# Patient Record
Sex: MALE | Race: BLACK OR AFRICAN AMERICAN | NOT HISPANIC OR LATINO | Employment: FULL TIME | URBAN - METROPOLITAN AREA
[De-identification: names, ages, dates, MRNs, and addresses within clinical notes are randomized per-mention and may not be internally consistent; named-entity substitution may affect disease eponyms.]

---

## 2018-09-16 ENCOUNTER — HOSPITAL ENCOUNTER (EMERGENCY)
Facility: HOSPITAL | Age: 33
Discharge: HOME | End: 2018-09-16
Attending: EMERGENCY MEDICINE

## 2018-09-16 VITALS
DIASTOLIC BLOOD PRESSURE: 88 MMHG | HEIGHT: 69 IN | OXYGEN SATURATION: 95 % | HEART RATE: 85 BPM | SYSTOLIC BLOOD PRESSURE: 128 MMHG | TEMPERATURE: 99 F | RESPIRATION RATE: 16 BRPM | BODY MASS INDEX: 22.96 KG/M2 | WEIGHT: 155 LBS

## 2018-09-16 DIAGNOSIS — L72.3 SEBACEOUS CYST OF LEFT AXILLA: Primary | ICD-10-CM

## 2018-09-16 PROCEDURE — 99282 EMERGENCY DEPT VISIT SF MDM: CPT

## 2018-09-16 RX ORDER — CEPHALEXIN 500 MG/1
1000 CAPSULE ORAL
Qty: 20 CAPSULE | Refills: 0 | Status: SHIPPED | OUTPATIENT
Start: 2018-09-16 | End: 2018-09-21

## 2018-09-16 NOTE — ED PROVIDER NOTES
History  Chief Complaint   Patient presents with    Cyst     According to the patient, he has a cyst under the left axlia for about one week  Patient reports that the area is painful when moving arm  Patient complains of a cyst underneath his left armpit  He denies fever chills  It began a few days ago  He had another cyst in the same region but it drained earlier this week  None       History reviewed  No pertinent past medical history  History reviewed  No pertinent surgical history  History reviewed  No pertinent family history  I have reviewed and agree with the history as documented  Social History   Substance Use Topics    Smoking status: Never Smoker    Smokeless tobacco: Never Used    Alcohol use Yes      Comment: occasional        Review of Systems   Constitutional: Negative for chills, fatigue and fever  Cardiovascular: Negative for chest pain  Musculoskeletal: Negative for neck pain  Skin: Positive for color change and rash  Negative for wound  Neurological: Negative for dizziness  Physical Exam  Physical Exam   Constitutional: He is oriented to person, place, and time  He appears well-developed and well-nourished  No distress  HENT:   Head: Normocephalic and atraumatic  Neurological: He is alert and oriented to person, place, and time  No cranial nerve deficit  Skin: Skin is warm  No rash noted  There is erythema  Patient has a slightly indurated soft tissue mass indicative of a sebaceous cysts in his left armpit  It is approximately 4 x 2 centimeters  There is some mild erythema and slight warmth  There is no appreciable adenitis  There is a central pustule evident  No spontaneous drainage  Vitals reviewed        Vital Signs  ED Triage Vitals [09/16/18 1309]   Temperature Pulse Respirations Blood Pressure SpO2   99 2 °F (37 3 °C) 86 18 134/72 95 %      Temp Source Heart Rate Source Patient Position - Orthostatic VS BP Location FiO2 (%) Tympanic Monitor Lying Right arm --      Pain Score       7           Vitals:    09/16/18 1309 09/16/18 1418   BP: 134/72 128/88   Pulse: 86 85   Patient Position - Orthostatic VS: Lying Lying       Visual Acuity      ED Medications  Medications - No data to display    Diagnostic Studies  Results Reviewed     None                 No orders to display              Procedures  Incision/Drainage  Date/Time: 9/16/2018 4:55 PM  Performed by: Yana Larkin  Authorized by: Ken CORDERO     Patient location:  ED  Other Assisting Provider: No    Consent:     Consent obtained:  Verbal    Consent given by:  Patient    Risks discussed:  Bleeding, incomplete drainage and infection    Alternatives discussed:  No treatment and alternative treatment  Universal protocol:     Patient identity confirmed:  Verbally with patient and provided demographic data  Location:     Type:  Abscess    Location:  Upper extremity    Upper extremity location:  L arm  Anesthesia (see MAR for exact dosages): Anesthesia method:  Local infiltration    Local anesthetic:  Lidocaine 1% WITH epi  Procedure details:     Complexity:  Intermediate    Needle aspiration: no      Incision types:  Stab incision    Scalpel blade:  11    Approach:  Open    Incision depth:  Subcutaneous and submucosal    Drainage:  Purulent    Drainage amount:  Scant    Wound treatment:  Wound left open and packing placed    Packing materials:  1/2 in iodoform gauze    Amount 1/2" iodoform:  2  Post-procedure details:     Patient tolerance of procedure: Tolerated well, no immediate complications  Comments:      4 x 2 centimeter abscess incised and drained in the left axilla  Phone Contacts  ED Phone Contact    ED Course    examination and evaluation  Exam indicative of a small abscess in the left axilla  Incision produce a small amount of purulent drainage as well as sebaceous material   Small amount of iodoform gauze packing placed    Patient given a prescription of Keflex  Asked to follow up with the family physician and perhaps also involve a surgical specialist her dermatologist                             MDM  CritCare Time    Disposition  Final diagnoses:   Sebaceous cyst of left axilla     Time reflects when diagnosis was documented in both MDM as applicable and the Disposition within this note     Time User Action Codes Description Comment    9/16/2018  2:10 PM Frederick Isela Add [L72 3] Sebaceous cyst of left axilla       ED Disposition     ED Disposition Condition Comment    Discharge  [facility] discharge to home/self care  Condition at discharge: Stable        Follow-up Information    None         There are no discharge medications for this patient  No discharge procedures on file      ED Provider  Electronically Signed by           Fredy Dover DO  09/16/18 9367

## 2018-09-16 NOTE — DISCHARGE INSTRUCTIONS
USE TYLENOL AND/OR IBUPROFEN IF NEEDED FOR PAIN  TAKE THE ANTIBIOTIC TWICE DAILY AS PRESCRIBED FOR 5 DAYS  SEE YOUR FAMILY DOCTOR IN 2-3 DAYS FOR PACKING REMOVAL AND GENERAL WOUND CHECK  AS DISCUSSED YOU MAY NEED TO SEE A SPECIALIST FOR FURTHER TREATMENT  Cyst   WHAT YOU NEED TO KNOW:   A cyst is a round, firm lump found almost anywhere on your body  Cysts may grow slowly but are not cancerous  Treatment is not needed if you have no symptoms  Cysts can be opened and drained if they become infected or cause problems  Cysts can grow larger and make it hard for you to do your daily activities  You may also need antibiotics if there is an infection  You may need surgery to remove the cyst completely  DISCHARGE INSTRUCTIONS:   Medicines:   · Antibiotics  may be given to treat or prevent an infection  · Take your medicine as directed  Contact your healthcare provider if you think your medicine is not helping or if you have side effects  Tell him of her if you are allergic to any medicine  Keep a list of the medicines, vitamins, and herbs you take  Include the amounts, and when and why you take them  Bring the list or the pill bottles to follow-up visits  Carry your medicine list with you in case of an emergency  Return to the emergency department if:   · You develop a fever  · The area around your cyst becomes swollen, red, and painful  · Your cyst continues to drain for 2 days after you start antibiotics  Care for your wound as directed: If you have had your cyst drained or removed, care for your wound as directed  Carefully wash the wound with soap and water  Dry the area and put on new, clean bandages as directed  Change your bandages when they get wet or dirty  Follow up with your healthcare provider as directed: Your wound may need to be checked if your cyst was removed in the emergency department  You may need to see a surgeon if your cyst could not be removed   Write down your questions so you remember to ask during your visits  © 2017 [employer] Information is for End User's use only and may not be sold, redistributed or otherwise used for commercial purposes  All illustrations and images included in CareNotes® are the copyrighted property of A D A M [employer]  or [employer] Neal  The above information is an  only  It is not intended as medical advice for individual conditions or treatments  Talk to your doctor, nurse or pharmacist before following any medical regimen to see if it is safe and effective for you

## 2019-01-31 ENCOUNTER — APPOINTMENT (EMERGENCY)
Dept: RADIOLOGY | Facility: HOSPITAL | Age: 34
DRG: 683 | End: 2019-01-31

## 2019-01-31 ENCOUNTER — HOSPITAL ENCOUNTER (INPATIENT)
Facility: HOSPITAL | Age: 34
LOS: 4 days | Discharge: HOME | DRG: 683 | End: 2019-02-04
Attending: EMERGENCY MEDICINE | Admitting: HOSPITALIST

## 2019-01-31 ENCOUNTER — APPOINTMENT (OUTPATIENT)
Dept: RADIOLOGY | Facility: HOSPITAL | Age: 34
DRG: 683 | End: 2019-01-31

## 2019-01-31 DIAGNOSIS — F14.929 COCAINE INTOXICATION (HCC): Primary | ICD-10-CM

## 2019-01-31 DIAGNOSIS — M79.89 SWELLING OF RIGHT HAND: ICD-10-CM

## 2019-01-31 DIAGNOSIS — T33.011D: ICD-10-CM

## 2019-01-31 DIAGNOSIS — T33.521A FROSTBITE OF RIGHT HAND, INITIAL ENCOUNTER: ICD-10-CM

## 2019-01-31 DIAGNOSIS — T68.XXXA HYPOTHERMIA, INITIAL ENCOUNTER: ICD-10-CM

## 2019-01-31 DIAGNOSIS — N17.9 AKI (ACUTE KIDNEY INJURY) (HCC): ICD-10-CM
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
## 2019-01-31 DIAGNOSIS — T69.9XXA COLD INJURY, INITIAL ENCOUNTER: ICD-10-CM

## 2019-01-31 PROBLEM — T33.011A: Status: ACTIVE | Noted: 2019-01-31

## 2019-01-31 PROBLEM — R00.0 TACHYCARDIA: Status: ACTIVE | Noted: 2019-01-31

## 2019-01-31 PROBLEM — F19.10 SUBSTANCE ABUSE (HCC): Status: ACTIVE | Noted: 2019-01-31

## 2019-01-31 PROBLEM — F15.929 AMPHETAMINE INTOXICATION (HCC): Status: ACTIVE | Noted: 2019-01-31

## 2019-01-31 LAB
ALBUMIN SERPL BCP-MCNC: 5 G/DL (ref 3.5–5)
ALP SERPL-CCNC: 107 U/L (ref 46–116)
ALT SERPL W P-5'-P-CCNC: 26 U/L (ref 12–78)
AMPHETAMINES SERPL QL SCN: POSITIVE
ANION GAP SERPL CALCULATED.3IONS-SCNC: 12 MMOL/L (ref 4–13)
ANION GAP SERPL CALCULATED.3IONS-SCNC: 8 MMOL/L (ref 4–13)
APAP SERPL-MCNC: <2 UG/ML (ref 10–30)
AST SERPL W P-5'-P-CCNC: 58 U/L (ref 5–45)
ATRIAL RATE: 129 BPM
BARBITURATES UR QL: NEGATIVE
BASOPHILS # BLD AUTO: 0.03 THOUSANDS/ΜL (ref 0–0.1)
BASOPHILS NFR BLD AUTO: 0 % (ref 0–1)
BENZODIAZ UR QL: POSITIVE
BILIRUB SERPL-MCNC: 0.94 MG/DL (ref 0.2–1)
BUN SERPL-MCNC: 24 MG/DL (ref 5–25)
BUN SERPL-MCNC: 28 MG/DL (ref 5–25)
CALCIUM SERPL-MCNC: 10.4 MG/DL (ref 8.3–10.1)
CALCIUM SERPL-MCNC: 8.9 MG/DL (ref 8.3–10.1)
CHLORIDE SERPL-SCNC: 101 MMOL/L (ref 100–108)
CHLORIDE SERPL-SCNC: 102 MMOL/L (ref 100–108)
CK MB SERPL-MCNC: 8.4 NG/ML (ref 0–5)
CK MB SERPL-MCNC: <1 % (ref 0–2.5)
CK SERPL-CCNC: 1656 U/L (ref 39–308)
CO2 SERPL-SCNC: 22 MMOL/L (ref 21–32)
CO2 SERPL-SCNC: 25 MMOL/L (ref 21–32)
COCAINE UR QL: NEGATIVE
CREAT SERPL-MCNC: 1.37 MG/DL (ref 0.6–1.3)
CREAT SERPL-MCNC: 2 MG/DL (ref 0.6–1.3)
EOSINOPHIL # BLD AUTO: 0.01 THOUSAND/ΜL (ref 0–0.61)
EOSINOPHIL NFR BLD AUTO: 0 % (ref 0–6)
ERYTHROCYTE [DISTWIDTH] IN BLOOD BY AUTOMATED COUNT: 13.6 % (ref 11.6–15.1)
ETHANOL SERPL-MCNC: <3 MG/DL (ref 0–3)
GFR SERPL CREATININE-BSD FRML MDRD: 50 ML/MIN/1.73SQ M
GFR SERPL CREATININE-BSD FRML MDRD: 79 ML/MIN/1.73SQ M
GLUCOSE SERPL-MCNC: 144 MG/DL (ref 65–140)
GLUCOSE SERPL-MCNC: 83 MG/DL (ref 65–140)
HCT VFR BLD AUTO: 49.5 % (ref 36.5–49.3)
HGB BLD-MCNC: 15.3 G/DL (ref 12–17)
IMM GRANULOCYTES # BLD AUTO: 0.07 THOUSAND/UL (ref 0–0.2)
IMM GRANULOCYTES NFR BLD AUTO: 0 % (ref 0–2)
LYMPHOCYTES # BLD AUTO: 1.43 THOUSANDS/ΜL (ref 0.6–4.47)
LYMPHOCYTES NFR BLD AUTO: 9 % (ref 14–44)
MCH RBC QN AUTO: 24.6 PG (ref 26.8–34.3)
MCHC RBC AUTO-ENTMCNC: 30.9 G/DL (ref 31.4–37.4)
MCV RBC AUTO: 80 FL (ref 82–98)
METHADONE UR QL: NEGATIVE
MONOCYTES # BLD AUTO: 0.97 THOUSAND/ΜL (ref 0.17–1.22)
MONOCYTES NFR BLD AUTO: 6 % (ref 4–12)
NEUTROPHILS # BLD AUTO: 13.83 THOUSANDS/ΜL (ref 1.85–7.62)
NEUTS SEG NFR BLD AUTO: 85 % (ref 43–75)
NRBC BLD AUTO-RTO: 0 /100 WBCS
OPIATES UR QL SCN: NEGATIVE
P AXIS: 77 DEGREES
PCP UR QL: NEGATIVE
PLATELET # BLD AUTO: 323 THOUSANDS/UL (ref 149–390)
PMV BLD AUTO: 11.1 FL (ref 8.9–12.7)
POTASSIUM SERPL-SCNC: 3.8 MMOL/L (ref 3.5–5.3)
POTASSIUM SERPL-SCNC: 5.4 MMOL/L (ref 3.5–5.3)
PR INTERVAL: 132 MS
PROT SERPL-MCNC: 9.2 G/DL (ref 6.4–8.2)
QRS AXIS: 51 DEGREES
QRSD INTERVAL: 66 MS
QT INTERVAL: 312 MS
QTC INTERVAL: 457 MS
RBC # BLD AUTO: 6.23 MILLION/UL (ref 3.88–5.62)
SALICYLATES SERPL-MCNC: <3 MG/DL (ref 3–20)
SODIUM SERPL-SCNC: 135 MMOL/L (ref 136–145)
SODIUM SERPL-SCNC: 135 MMOL/L (ref 136–145)
T WAVE AXIS: 43 DEGREES
T4 FREE SERPL-MCNC: 1.22 NG/DL (ref 0.76–1.46)
THC UR QL: NEGATIVE
TROPONIN I SERPL-MCNC: <0.02 NG/ML
TSH SERPL DL<=0.05 MIU/L-ACNC: 2.06 UIU/ML (ref 0.36–3.74)
VENTRICULAR RATE: 129 BPM
WBC # BLD AUTO: 16.34 THOUSAND/UL (ref 4.31–10.16)

## 2019-01-31 PROCEDURE — 85025 COMPLETE CBC W/AUTO DIFF WBC: CPT | Performed by: EMERGENCY MEDICINE

## 2019-01-31 PROCEDURE — 80329 ANALGESICS NON-OPIOID 1 OR 2: CPT | Performed by: EMERGENCY MEDICINE

## 2019-01-31 PROCEDURE — 99253 IP/OBS CNSLTJ NEW/EST LOW 45: CPT | Performed by: HOSPITALIST

## 2019-01-31 PROCEDURE — 80053 COMPREHEN METABOLIC PANEL: CPT | Performed by: EMERGENCY MEDICINE

## 2019-01-31 PROCEDURE — 93010 ELECTROCARDIOGRAM REPORT: CPT | Performed by: INTERNAL MEDICINE

## 2019-01-31 PROCEDURE — 71045 X-RAY EXAM CHEST 1 VIEW: CPT

## 2019-01-31 PROCEDURE — 99253 IP/OBS CNSLTJ NEW/EST LOW 45: CPT | Performed by: SURGERY

## 2019-01-31 PROCEDURE — 36415 COLL VENOUS BLD VENIPUNCTURE: CPT | Performed by: EMERGENCY MEDICINE

## 2019-01-31 PROCEDURE — 93005 ELECTROCARDIOGRAM TRACING: CPT

## 2019-01-31 PROCEDURE — 80320 DRUG SCREEN QUANTALCOHOLS: CPT | Performed by: EMERGENCY MEDICINE

## 2019-01-31 PROCEDURE — 82553 CREATINE MB FRACTION: CPT

## 2019-01-31 PROCEDURE — 99285 EMERGENCY DEPT VISIT HI MDM: CPT

## 2019-01-31 PROCEDURE — 80307 DRUG TEST PRSMV CHEM ANLYZR: CPT | Performed by: EMERGENCY MEDICINE

## 2019-01-31 PROCEDURE — 84443 ASSAY THYROID STIM HORMONE: CPT | Performed by: EMERGENCY MEDICINE

## 2019-01-31 PROCEDURE — 84484 ASSAY OF TROPONIN QUANT: CPT | Performed by: EMERGENCY MEDICINE

## 2019-01-31 PROCEDURE — 82550 ASSAY OF CK (CPK): CPT

## 2019-01-31 PROCEDURE — 96375 TX/PRO/DX INJ NEW DRUG ADDON: CPT

## 2019-01-31 PROCEDURE — 84439 ASSAY OF FREE THYROXINE: CPT | Performed by: EMERGENCY MEDICINE

## 2019-01-31 PROCEDURE — 96365 THER/PROPH/DIAG IV INF INIT: CPT

## 2019-01-31 PROCEDURE — 73130 X-RAY EXAM OF HAND: CPT

## 2019-01-31 PROCEDURE — 80048 BASIC METABOLIC PNL TOTAL CA: CPT | Performed by: INTERNAL MEDICINE

## 2019-01-31 RX ORDER — SODIUM CHLORIDE, SODIUM GLUCONATE, SODIUM ACETATE, POTASSIUM CHLORIDE, MAGNESIUM CHLORIDE, SODIUM PHOSPHATE, DIBASIC, AND POTASSIUM PHOSPHATE .53; .5; .37; .037; .03; .012; .00082 G/100ML; G/100ML; G/100ML; G/100ML; G/100ML; G/100ML; G/100ML
2000 INJECTION, SOLUTION INTRAVENOUS ONCE
Status: COMPLETED | OUTPATIENT
Start: 2019-01-31 | End: 2019-01-31

## 2019-01-31 RX ORDER — ACETAMINOPHEN 325 MG/1
650 TABLET ORAL EVERY 6 HOURS PRN
Status: DISCONTINUED | OUTPATIENT
Start: 2019-01-31 | End: 2019-01-31

## 2019-01-31 RX ORDER — ACETAMINOPHEN 325 MG/1
650 TABLET ORAL EVERY 6 HOURS PRN
Status: DISCONTINUED | OUTPATIENT
Start: 2019-01-31 | End: 2019-02-04 | Stop reason: HOSPADM

## 2019-01-31 RX ORDER — SODIUM CHLORIDE, SODIUM GLUCONATE, SODIUM ACETATE, POTASSIUM CHLORIDE, MAGNESIUM CHLORIDE, SODIUM PHOSPHATE, DIBASIC, AND POTASSIUM PHOSPHATE .53; .5; .37; .037; .03; .012; .00082 G/100ML; G/100ML; G/100ML; G/100ML; G/100ML; G/100ML; G/100ML
125 INJECTION, SOLUTION INTRAVENOUS CONTINUOUS
Status: DISCONTINUED | OUTPATIENT
Start: 2019-01-31 | End: 2019-02-03

## 2019-01-31 RX ORDER — OXYCODONE HYDROCHLORIDE 5 MG/1
5 TABLET ORAL EVERY 6 HOURS PRN
Status: DISCONTINUED | OUTPATIENT
Start: 2019-01-31 | End: 2019-02-04

## 2019-01-31 RX ORDER — OXYCODONE HYDROCHLORIDE 10 MG/1
10 TABLET ORAL EVERY 8 HOURS PRN
Status: DISCONTINUED | OUTPATIENT
Start: 2019-01-31 | End: 2019-02-02

## 2019-01-31 RX ORDER — OXYMETAZOLINE HYDROCHLORIDE 0.05 G/100ML
1 SPRAY NASAL EVERY 12 HOURS SCHEDULED
Status: COMPLETED | OUTPATIENT
Start: 2019-01-31 | End: 2019-01-31

## 2019-01-31 RX ORDER — LORAZEPAM 2 MG/ML
INJECTION INTRAMUSCULAR
Status: DISCONTINUED
Start: 2019-01-31 | End: 2019-01-31 | Stop reason: WASHOUT

## 2019-01-31 RX ORDER — DIAZEPAM 5 MG/ML
10 INJECTION, SOLUTION INTRAMUSCULAR; INTRAVENOUS ONCE
Status: COMPLETED | OUTPATIENT
Start: 2019-01-31 | End: 2019-01-31

## 2019-01-31 RX ORDER — HEPARIN SODIUM 5000 [USP'U]/ML
5000 INJECTION, SOLUTION INTRAVENOUS; SUBCUTANEOUS EVERY 8 HOURS SCHEDULED
Status: DISCONTINUED | OUTPATIENT
Start: 2019-01-31 | End: 2019-01-31

## 2019-01-31 RX ADMIN — NITROGLYCERIN 0.5 INCH: 20 OINTMENT TOPICAL at 18:35

## 2019-01-31 RX ADMIN — ACETAMINOPHEN 650 MG: 325 TABLET, FILM COATED ORAL at 18:34

## 2019-01-31 RX ADMIN — NITROGLYCERIN 0.5 INCH: 20 OINTMENT TOPICAL at 22:10

## 2019-01-31 RX ADMIN — SODIUM CHLORIDE, SODIUM GLUCONATE, SODIUM ACETATE, POTASSIUM CHLORIDE, MAGNESIUM CHLORIDE, SODIUM PHOSPHATE, DIBASIC, AND POTASSIUM PHOSPHATE 125 ML/HR: .53; .5; .37; .037; .03; .012; .00082 INJECTION, SOLUTION INTRAVENOUS at 06:15

## 2019-01-31 RX ADMIN — SODIUM CHLORIDE, SODIUM GLUCONATE, SODIUM ACETATE, POTASSIUM CHLORIDE, MAGNESIUM CHLORIDE, SODIUM PHOSPHATE, DIBASIC, AND POTASSIUM PHOSPHATE 2000 ML: .53; .5; .37; .037; .03; .012; .00082 INJECTION, SOLUTION INTRAVENOUS at 04:04

## 2019-01-31 RX ADMIN — ACETAMINOPHEN 650 MG: 325 TABLET, FILM COATED ORAL at 05:57

## 2019-01-31 RX ADMIN — OXYCODONE HYDROCHLORIDE 10 MG: 10 TABLET ORAL at 18:34

## 2019-01-31 RX ADMIN — Medication 10 MG: at 04:04

## 2019-01-31 RX ADMIN — OXYMETAZOLINE HYDROCHLORIDE 1 SPRAY: 0.05 SPRAY NASAL at 05:48

## 2019-01-31 RX ADMIN — OXYMETAZOLINE HYDROCHLORIDE 1 SPRAY: 0.05 SPRAY NASAL at 22:06

## 2019-01-31 RX ADMIN — SODIUM CHLORIDE, SODIUM GLUCONATE, SODIUM ACETATE, POTASSIUM CHLORIDE, MAGNESIUM CHLORIDE, SODIUM PHOSPHATE, DIBASIC, AND POTASSIUM PHOSPHATE 125 ML/HR: .53; .5; .37; .037; .03; .012; .00082 INJECTION, SOLUTION INTRAVENOUS at 15:08

## 2019-01-31 RX ADMIN — SODIUM CHLORIDE, SODIUM GLUCONATE, SODIUM ACETATE, POTASSIUM CHLORIDE, MAGNESIUM CHLORIDE, SODIUM PHOSPHATE, DIBASIC, AND POTASSIUM PHOSPHATE 125 ML/HR: .53; .5; .37; .037; .03; .012; .00082 INJECTION, SOLUTION INTRAVENOUS at 23:20

## 2019-01-31 NOTE — ED ATTENDING ATTESTATION
Asiya Burch MD, saw and evaluated the patient  I have discussed the patient with the resident/non-physician practitioner and agree with the resident's/non-physician practitioner's findings, Plan of Care, and MDM as documented in the resident's/non-physician practitioner's note, except where noted  All available labs and Radiology studies were reviewed  At this point I agree with the current assessment done in the Emergency Department  I have conducted an independent evaluation of this patient a history and physical is as follows:      Critical Care Time  Procedures     33 yo male found outside and admitted to snorting cocaine  Pt c/o right ear pain otherwise no other complaints  No signs of trauma  Pt with no pmh  Vss, hypothermic, tachy, hypertensive, lungs cta, rrr, white powder at nares, no frostbite noted  Pt appears confused, right pinna swelling, tm clear  Warm ivf, carol hugger, ekg, labs

## 2019-01-31 NOTE — ED NOTES
Admitting at [address] [facility], [address] [place] [address]  01/31/19 2658

## 2019-01-31 NOTE — PROGRESS NOTES
[facility] Admission Note   Unit/Bed # @DBLINK (SRINATH,27630)@ Encounter: [id number]  SOD Team A          Keri Marks 34 y o  male [id number]       Patient seen and examined  Reviewed H&P per Dr Anthony Mclaughlin  Agree with the assessment and plan  Matilda Devlin male with no PMH found wandering on the street brought by police and EMS for evaluation  Urine drug screen positive for amphetamines  History of cocaine and heroine abuse  Patient works as a , does not take any medications on regular basis       Assessment/Plan: Principal Problem:    Mild hypothermia   Active Problems:    Substance abuse (HCC)    Tachycardia     Amphematine intoxication - symptomatic management   - patient currently in sinus tachycardia on telemetry - SE of amphetamines  - telemetry monitoring   - f/u on lactic acid, CK, BMP  - iv fluids and given 10mg iv diazepam    Right hand swelling 2/2 freeze injury - warm compress given by nursing staff    History of heroine, cocaine, amphetamine abuse - counseling regarding cessation provided    Hypothermia - initially 93 9, given bare hugger and warm iv fluids - improved to 97 6      Disposition:  OBSERVATION    Expected LOS: <2 Melissa León MD

## 2019-01-31 NOTE — LETTER
[address] [place] [address] 4  [address] [place] [address]  Dept: [phone]    February 4, 2019     Patient: Tommy Bo   YOB: 1985   Date of Visit: 1/31/2019       To Whom it May Concern:    Tommy Bo is under my professional care  He was seen in the hospital from 1/31/2019   to 02/04/19  He may return to work with limitations on Monday 2/11/19, and please make accommodations for his right hand injury as able       If you have any questions or concerns, please don't hesitate to call           Sincerely,          Peewee Silver, DO

## 2019-01-31 NOTE — ED PROVIDER NOTES
History  Chief Complaint   Patient presents with    Altered Mental Status     Per EMS, patient found by police wandering around outside in the cold and altered  Patient admitted to snorting cocaine per EMS  Patient c/o ear pain and cold   Cold Exposure     34year-old man presents for evaluation of intoxication  Patient was found wandering the streets by Police  He reports sniffing cocaine earlier in the night  History is limited secondary to intoxication  He is complaining of right ear pain  No reported falls or trauma  On arrival, patient is hypothermic at 93 9, tachycardic in the 547S systolic, and tachycardic in the 120s with otherwise unremarkable vital signs  Physical exam shows an agitated patient responding to internal stimuli  Feet are cold with palpable pulses and no obvious cold injury  Right pinna is edematous and hyperemic without bullae  No external signs of trauma  Pupils are 3mm and reactive  No rigidity or clonus  Likely sympathomimetic toxidrome secondary to cocaine with cold exposure  Will warm with Dianna hugger and warm IV fluids  Will perform EKG, basic labs, coma panel, and UDS  Will administer IV Valium and reassess  None       History reviewed  No pertinent past medical history  History reviewed  No pertinent surgical history  History reviewed  No pertinent family history  I have reviewed and agree with the history as documented      Social History   Substance Use Topics    Smoking status: Never Smoker    Smokeless tobacco: Never Used    Alcohol use Yes        Review of Systems   Unable to perform ROS: Mental status change       Physical Exam  ED Triage Vitals [01/31/19 0338]   Temperature Pulse Respirations Blood Pressure SpO2   (!) 93 9 °F (34 4 °C) (!) 121 21 163/93 96 %      Temp Source Heart Rate Source Patient Position - Orthostatic VS BP Location FiO2 (%)   Rectal Monitor Lying Right arm --      Pain Score       2           Orthostatic Vital Signs  Vitals: 01/31/19 0600 01/31/19 0630 01/31/19 0745 01/31/19 0830   BP: 153/88 139/100 130/75 128/83   Pulse: (!) 114 80 96 (!) 110   Patient Position - Orthostatic VS: Lying Lying Lying Lying       Physical Exam   Constitutional: He appears well-developed and well-nourished  No distress  HENT:   Head: Normocephalic and atraumatic  White powder in B/L nares, scant blood in B/L nares, no septal hematoma, no signs of basilar skull fracture, right pinna edematous and hyperemic without bullae, now warm and perfused   Eyes: Pupils are equal, round, and reactive to light  EOM are normal    Pupils 3mm and reactive B/L, conjunctival injection B/L   Neck: Normal range of motion  Neck supple  No midline tenderness or signs of meningismus   Cardiovascular: Regular rhythm and normal heart sounds  Exam reveals no gallop and no friction rub  No murmur heard  Tachycardic   Pulmonary/Chest: Effort normal and breath sounds normal  No respiratory distress  He has no wheezes  He has no rales  Abdominal: Soft  He exhibits no distension  There is no tenderness  There is no rebound and no guarding  Musculoskeletal: He exhibits no edema or tenderness  Extremities cool with palpable pulses and no signs of cold injury   Neurological: He is alert  GCS 14, confused, agitated, responding to internal stimuli, moving all extremities spontaneously, intermittently following commands   Skin: Skin is dry  He is not diaphoretic  Vitals reviewed        ED Medications  Medications   oxymetazoline (AFRIN) 0 05 % nasal spray 1 spray (1 spray Each Nare Given 1/31/19 0548)   acetaminophen (TYLENOL) tablet 650 mg (650 mg Oral Given 1/31/19 0557)   multi-electrolyte (ISOLYTE-S PH 7 4 equivalent) IV solution (125 mL/hr Intravenous New Bag 1/31/19 0615)   diazepam (VALIUM) injection 10 mg (10 mg Intravenous Given 1/31/19 0404)   multi-electrolyte (ISOLYTE-S PH 7 4) bolus 2,000 mL (0 mL Intravenous Stopped 1/31/19 0530)       Diagnostic Studies  Results Reviewed     Procedure Component Value Units Date/Time    CKMB [[id number]]  (Abnormal) Collected:  01/31/19 0615    Lab Status:  Final result Specimen:  Blood from Arm, Left Updated:  01/31/19 0823     CK-MB Index <1 0 %      CK-MB 8 4 (H) ng/mL     CK (with reflex to MB) [[id number]]  (Abnormal) Collected:  01/31/19 0615    Lab Status:  Final result Specimen:  Blood from Arm, Left Updated:  01/31/19 0805     Total CK 1,656 (H) U/L     Basic metabolic panel [[id number]]  (Abnormal) Collected:  01/31/19 0615    Lab Status:  Final result Specimen:  Blood from Arm, Left Updated:  01/31/19 0641     Sodium 135 (L) mmol/L      Potassium 3 8 mmol/L      Chloride 102 mmol/L      CO2 25 mmol/L      ANION GAP 8 mmol/L      BUN 24 mg/dL      Creatinine 1 37 (H) mg/dL      Glucose 83 mg/dL      Calcium 8 9 mg/dL      eGFR 79 ml/min/1 73sq m     Narrative:         National Kidney Disease Education Program recommendations are as follows:  GFR calculation is accurate only with a steady state creatinine  Chronic Kidney disease less than 60 ml/min/1 73 sq  meters  Kidney failure less than 15 ml/min/1 73 sq  meters  Lactic acid, plasma [[id number]] Updated:  01/31/19 4363    Lab Status:  No result Specimen:  Blood from Arm, Left     Rapid drug screen, urine [[id number]]  (Abnormal) Collected:  01/31/19 0506    Lab Status:  Final result Specimen:  Urine from Urine, Other Updated:  01/31/19 0531     Amph/Meth UR Positive (A)     Barbiturate Ur Negative     Benzodiazepine Urine Positive (A)     Cocaine Urine Negative     Methadone Urine Negative     Opiate Urine Negative     PCP Ur Negative     THC Urine Negative    Narrative:         Presumptive report  If requested, specimen will be sent to reference lab for confirmation  FOR MEDICAL PURPOSES ONLY  IF CONFIRMATION NEEDED PLEASE CONTACT THE LAB WITHIN 5 DAYS      Drug Screen Cutoff Levels:  AMPHETAMINE/METHAMPHETAMINES  1000 ng/mL  BARBITURATES     200 ng/mL  BENZODIAZEPINES     200 ng/mL  COCAINE      300 ng/mL  METHADONE      300 ng/mL  OPIATES      300 ng/mL  PHENCYCLIDINE     25 ng/mL  THC       50 ng/mL    TSH [[id number]]  (Normal) Collected:  01/31/19 0401    Lab Status:  Final result Specimen:  Blood from Arm, Left Updated:  01/31/19 0448     TSH 3RD GENERATON 2 060 uIU/mL     Narrative:         Patients undergoing fluorescein dye angiography may retain small amounts of fluorescein in the body for 48-72 hours post procedure  Samples containing fluorescein can produce falsely depressed TSH values  If the patient had this procedure,a specimen should be resubmitted post fluorescein clearance  Comprehensive metabolic panel [[id number]]  (Abnormal) Collected:  01/31/19 0401    Lab Status:  Final result Specimen:  Blood from Arm, Left Updated:  01/31/19 0443     Sodium 135 (L) mmol/L      Potassium 5 4 (H) mmol/L      Chloride 101 mmol/L      CO2 22 mmol/L      ANION GAP 12 mmol/L      BUN 28 (H) mg/dL      Creatinine 2 00 (H) mg/dL      Glucose 144 (H) mg/dL      Calcium 10 4 (H) mg/dL      AST 58 (H) U/L      ALT 26 U/L      Alkaline Phosphatase 107 U/L      Total Protein 9 2 (H) g/dL      Albumin 5 0 g/dL      Total Bilirubin 0 94 mg/dL      eGFR 50 ml/min/1 73sq m     Narrative:         National Kidney Disease Education Program recommendations are as follows:  GFR calculation is accurate only with a steady state creatinine  Chronic Kidney disease less than 60 ml/min/1 73 sq  meters  Kidney failure less than 15 ml/min/1 73 sq  meters      T4, free [id number]  (Normal) Collected:  01/31/19 0401    Lab Status:  Final result Specimen:  Blood from Arm, Left Updated:  01/31/19 0443     Free T4 0 19 ng/dL     Salicylate level [[id number]]  (Abnormal) Collected:  01/31/19 0401    Lab Status:  Final result Specimen:  Blood from Arm, Left Updated:  00/03/52 9689     Salicylate Lvl <3 (L) mg/dL     Acetaminophen level [[id number]]  (Abnormal) Collected:  01/31/19 0401    Lab Status:  Final result Specimen:  Blood from Arm, Left Updated:  01/31/19 0443     Acetaminophen Level <2 (L) ug/mL     Troponin I [[id number]]  (Normal) Collected:  01/31/19 0401    Lab Status:  Final result Specimen:  Blood from Arm, Left Updated:  01/31/19 0439     Troponin I <0 02 ng/mL     Ethanol [[id number]]  (Normal) Collected:  01/31/19 0401    Lab Status:  Final result Specimen:  Blood from Arm, Left Updated:  01/31/19 0437     Ethanol Lvl <3 mg/dL     CBC and differential [[id number]]  (Abnormal) Collected:  01/31/19 0401    Lab Status:  Final result Specimen:  Blood from Arm, Left Updated:  01/31/19 0436     WBC 16 34 (H) Thousand/uL      RBC 6 23 (H) Million/uL      Hemoglobin 15 3 g/dL      Hematocrit 49 5 (H) %      MCV 80 (L) fL      MCH 24 6 (L) pg      MCHC 30 9 (L) g/dL      RDW 13 6 %      MPV 11 1 fL      Platelets 703 Thousands/uL      nRBC 0 /100 WBCs      Neutrophils Relative 85 (H) %      Immat GRANS % 0 %      Lymphocytes Relative 9 (L) %      Monocytes Relative 6 %      Eosinophils Relative 0 %      Basophils Relative 0 %      Neutrophils Absolute 13 83 (H) Thousands/µL      Immature Grans Absolute 0 07 Thousand/uL      Lymphocytes Absolute 1 43 Thousands/µL      Monocytes Absolute 0 97 Thousand/µL      Eosinophils Absolute 0 01 Thousand/µL      Basophils Absolute 0 03 Thousands/µL                  XR chest 1 view portable   ED Interpretation by Genaro Parra MD (01/31 7914)   No acute disease      Final Result by Alex Vasquez DO (01/31 3692)      No acute cardiopulmonary disease              Workstation performed: [id number]         XR hand 3+ vw right    (Results Pending)         Procedures  Procedures      Phone Consults  ED Phone Contact    ED Course  ED Course as of Jan 31 0853   Thu Jan 31, 2019 2046 Procedure Note: EKG  Date/Time: 01/31/19 5:19 AM   Indications / Diagnosis: Intoxication  ECG reviewed by me, the ED Provider: yes   The EKG demonstrates:  Rhythm: sinus tachycardia  Intervals: normal intervals  Axis: normal axis  QRS/Blocks: normal QRS  ST Changes: No acute ST Changes, no STD/CHRISTIANA  Yenny Caper waves                                  MDM  Number of Diagnoses or Management Options  LORETTA (acute kidney injury) ([facility] ):   Cocaine intoxication ([email or web address] ):   Cold injury, initial encounter:   Hypothermia, initial encounter:   Diagnosis management comments: Exam consistent with sympathomimetic ingestion in the setting of reported cocaine use  Patient was given 10mg IV Valium with symptomatic improvement  Tachycardia improved with IV fluids  Workup significant for Scr of 2 with unknown baseline  Patient was admitted to SOD for further management  Disposition  Final diagnoses:   Cocaine intoxication ([email or web address] 75 )   LORETTA (acute kidney injury) ([place] [address] )   Hypothermia, initial encounter   Cold injury, initial encounter     Time reflects when diagnosis was documented in both MDM as applicable and the Disposition within this note     Time User Action Codes Description Comment    1/31/2019  4:56 AM Stacie Orellana Add [C59 416] Cocaine intoxication ([place] [address] )     1/31/2019  4:56 AM Stacie Orellana Add [N17 9] LORETTA (acute kidney injury) ([email or web address] 75 )     1/31/2019  4:56 AM Stacie Orellana Add Anniece Nail  XXXA] Hypothermia, initial encounter     1/31/2019  4:56 AM Stacie Orellana Add Ben Kartik  9XXA] Cold injury, initial encounter       ED Disposition     ED Disposition Condition Date/Time Comment    Admit  Thu Jan 31, 2019  4:57 AM Case was discussed with SOD and the patient's admission status was agreed to be Admission Status: observation status to the service of Dr Josué Wall  Follow-up Information    None         Patient's Medications    No medications on file     No discharge procedures on file  ED Provider  Attending physically available and evaluated Lesia Penn I managed the patient along with the ED Attending      Electronically Signed by         Aracely Butt MD  01/31/19 0629

## 2019-01-31 NOTE — H&P
INTERNAL MEDICINE HISTORY AND PHYSICAL  ED 13 SOD Team A    NAME: Luzma Maher  AGE: 34 y o  SEX: male  : 1985   MRN: [id number]  ENCOUNTER: [id number]    DATE: 2019  TIME: 5:34 AM    Primary Care Physician: No primary care provider on file  Admitting Provider: Issa Kirkpatrick MD    Chief complaint: Hypothermia    History of Present Illness     Luzma Maher is a 34 y o  male with no significant past medical history other than substance abuse (admits to cocaine and methamphetamine) who was found by the police on the streets  Initially on presentation to the ED he said he was snorting cocaine but later said that he did not snort cocaine today but injected what he thought was methamphetamine into his right arm  He says he has injected it before so he thinks what he injected today was not the same because he became extremely confused after injecting, left his house and started wandering on the streets  Upon arrival patient was hypothermic to 93 9, tachycardic  He is currently complaining of pain, swelling and restriction of movements of the right hand  No nausea, vomiting, palpitations, chest pain, tremors, agitation, anxiety  Review of Systems   Review of Systems   Musculoskeletal: Positive for joint swelling  All other systems reviewed and are negative  Past Medical History   History reviewed  No pertinent past medical history  Past Surgical History   History reviewed  No pertinent surgical history  Social History     History   Alcohol Use    Yes     History   Drug Use    Types: Cocaine     History   Smoking Status    Never Smoker   Smokeless Tobacco    Never Used       Family History   History reviewed  No pertinent family history      Medications Prior to Admission     Prior to Admission medications    Not on File       Allergies   No Known Allergies    Objective     Vitals:    19 0338 19 0430 19 0439 19 0500   BP: 163/93 153/83  118/90   BP Location: Right arm Right arm  Right arm   Pulse: (!) 121 (!) 114  (!) 118   Resp: 21 21  21   Temp: (!) 93 9 °F (34 4 °C)  97 6 °F (36 4 °C)    TempSrc: Rectal  Oral    SpO2: 96% 94%  100%   Weight: 68 kg (150 lb)      Height: 5' 9" (1 753 m)        Body mass index is 22 15 kg/m²  No intake or output data in the 24 hours ending 01/31/19 0534  Invasive Devices     Peripheral Intravenous Line            Peripheral IV 01/31/19 Left Antecubital less than 1 day                Physical Exam  Physical Exam   Constitutional: He is oriented to person, place, and time  He appears well-developed and well-nourished  Eyes: Pupils are equal, round, and reactive to light  EOM are normal    Cardiovascular: Normal heart sounds  Tachycardia present  Pulmonary/Chest: Effort normal and breath sounds normal    Abdominal: Soft  Bowel sounds are normal    Musculoskeletal:        Arms:  Neurological: He is alert and oriented to person, place, and time  Psychiatric: He is not agitated and not aggressive  Lab Results: I have personally reviewed pertinent reports  Toxicology:   Results from last 7 days  Lab Units 01/31/19  0506 01/31/19  0401   BARBITURATE UR  Negative  --    BENZODIAZEPINE UR  Positive*  --    COCAINE UR  Negative  --    OPIATE UR  Negative  --    PCP UR  Negative  --    THC UR  Negative  --    ETHANOL LVL mg/dL  --  <3   ACETAMINOPHEN LVL ug/mL  --  <2*   SALICYLATE LVL mg/dL  --  <3*       Imaging: I have personally reviewed pertinent films in PACS  No results found  Microbiology: cultures obtained in emergency department include     Urinalysis:       Invalid input(s): URIBILINOGEN     Urine Micro:        EKG, Pathology, and Other Studies: I have personally reviewed pertinent reports        Medications given in Emergency Department     Medication Administration - last 24 hours from 01/30/2019 0534 to 01/31/2019 0534       Date/Time Order Dose Route Action Action by     01/31/2019 0404 diazepam (VALIUM) injection 10 mg 10 mg Intravenous Given Sukhi Jacobs RN     01/31/2019 0404 multi-electrolyte (ISOLYTE-S PH 7 4) bolus 2,000 mL 2,000 mL Intravenous New Bag Sukhi Jacobs RN          Assessment and Plan     Principal Problem:    Mild hypothermia   Active Problems:    Substance abuse (HCC)    Tachycardia    Hypothermia  -Mild hypothermia- 93 9  -Passive external and active external rewarming done with blankets and carol hugger, patients temperature improved to 97  -EKG is showing elevation of the J point consistent with hypothermia  -Telemetry monitoring- currently sinus tachycardic  -Check lactate and CK  -Continue iv fluids  -Check TSH    Substance abuse  -Drug screen shows Amphetamines  -Patient admits to snorting cocaine in the past  -Telemetry monitoring to look for arrythmias  -Patient given valium in the ED for sympathomimetic toxicity    Acute kidney injury  -Creatinine elevated to 2  -Check CK as rhabdo is a common complication of amphetamine toxicity and can cause Acute renal failure  -Continue iv fluids  -Monitor creatinine  -Avoid nephrotoxic agents    Tachycardia  -Due to hypothermia vs amphetamine induced  -Telemetry monitoring  -Currently sinus tachycardia, will continue to monitor      Code Status: Level 1 - Full Code  VTE Pharmacologic Prophylaxis: Reason for no pharmacologic prophylaxis No indication   VTE Mechanical Prophylaxis: reason for no mechanical VTE prophylaxis Ambulate  Admission Status: OBSERVATION    Admission Time  I spent 20 minutes admitting the patient  This involved direct patient contact where I performed a full history and physical, reviewing previous records, and reviewing laboratory and other diagnostic studies      Barbie Montenegro MD  Internal Medicine  PGY-1

## 2019-01-31 NOTE — PLAN OF CARE
Problem: SKIN/TISSUE INTEGRITY - ADULT  Goal: Skin integrity remains intact  INTERVENTIONS  - Identify patients at risk for skin breakdown  - Assess and monitor skin integrity  - Assess and monitor nutrition and hydration status  - Monitor labs (i e  albumin)  - Assess for incontinence   - Turn and reposition patient  - Assist with mobility/ambulation  - Relieve pressure over bony prominences  - Avoid friction and shearing  - Provide appropriate hygiene as needed including keeping skin clean and dry  - Evaluate need for skin moisturizer/barrier cream  - Collaborate with interdisciplinary team (i e  Nutrition, Rehabilitation, etc )   - Patient/family teaching  Outcome: Progressing    Goal: Incision(s), wounds(s) or drain site(s) healing without S/S of infection  INTERVENTIONS  - Assess and document risk factors for skin impairment   - Assess and document dressing, incision, wound bed, drain sites and surrounding tissue  - Initiate Nutrition services consult and/or wound management as needed  Outcome: Progressing      Problem: PAIN - ADULT  Goal: Verbalizes/displays adequate comfort level or baseline comfort level  Interventions:  - Encourage patient to monitor pain and request assistance  - Assess pain using appropriate pain scale  - Administer analgesics based on type and severity of pain and evaluate response  - Implement non-pharmacological measures as appropriate and evaluate response  - Consider cultural and social influences on pain and pain management  - Notify physician/advanced practitioner if interventions unsuccessful or patient reports new pain  Outcome: Progressing      Problem: INFECTION - ADULT  Goal: Absence or prevention of progression during hospitalization  INTERVENTIONS:  - Assess and monitor for signs and symptoms of infection  - Monitor lab/diagnostic results  - Monitor all insertion sites, i e  indwelling lines, tubes, and drains  - Monitor endotracheal (as able) and nasal secretions for changes in amount and color  - [place] appropriate cooling/warming therapies per order  - Administer medications as ordered  - Instruct and encourage patient and family to use good hand hygiene technique  - Identify and instruct in appropriate isolation precautions for identified infection/condition  Outcome: Progressing      Problem: SAFETY ADULT  Goal: Patient will remain free of falls  INTERVENTIONS:  - Assess patient frequently for physical needs  -  Identify cognitive and physical deficits and behaviors that affect risk of falls  -  [place] fall precautions as indicated by assessment   - Educate patient/family on patient safety including physical limitations  - Instruct patient to call for assistance with activity based on assessment  - Modify environment to reduce risk of injury  - Consider OT/PT consult to assist with strengthening/mobility  Outcome: Progressing      Problem: DISCHARGE PLANNING  Goal: Discharge to home or other facility with appropriate resources  INTERVENTIONS:  - Identify barriers to discharge w/patient and caregiver  - Arrange for needed discharge resources and transportation as appropriate  - Identify discharge learning needs (meds, wound care, etc )  - Arrange for interpretive services to assist at discharge as needed  - Refer to Case Management Department for coordinating discharge planning if the patient needs post-hospital services based on physician/advanced practitioner order or complex needs related to functional status, cognitive ability, or social support system  Outcome: Progressing      Problem: Knowledge Deficit  Goal: Patient/family/caregiver demonstrates understanding of disease process, treatment plan, medications, and discharge instructions  Complete learning assessment and assess knowledge base    Interventions:  - Provide teaching at level of understanding  - Provide teaching via preferred learning methods  Outcome: Progressing

## 2019-01-31 NOTE — PROGRESS NOTES
Patient assessed at bedside this afternoon with mother at bedside due to concerns about persistent ear and hand swelling  Patient not complaining of any pain, but does report diminishing sensation in R hand especially fourth and little fingers  Exam: R ear continues to be swollen  Has blister on posterior pinna which is new since this morning, clear, fluid filled  Sensations present over the ear though somewhat diminished  R hand swelling is persistent  Fourth and fifth fingers look somewhat cyanotic and feel cold to touch and have significantly diminished sensation to touch  Cyanosis extending to PIP joint  R thumb also cyanotic to thenar eminence with blistering over the dorsal aspect  Second and ring fingers are erythematous and warm and swollen and have sensation  A/P  -The patient clearly has evolving frostbite of his R ear and multiple fingers of R hand  Concerning for possibility of tissue/bone loss  Urgent surgery consult placed  Warming of hand and ear, will consider tPA after consultation with surgery

## 2019-01-31 NOTE — ED NOTES
Dr Orellana at the bedside for patient evaluation at this time     Franklin Lucas, RN  01/31/19 9382

## 2019-01-31 NOTE — ED NOTES
Pt's black Samsung S9 is charging at charge nurse desk with patient label attached       Morenita Camacho RN  01/31/19 5282

## 2019-01-31 NOTE — CONSULTS
Consultation - Plastic Surgery   Mignon Parham 34 y o  male MRN: [id number]  Unit/Bed#: [facility] 425-01 Encounter: [id number]    Assessment/Plan     Assessment:  Mignon Parham is a 34 y o  male with history of polysubstance abuse who presented via EMS altered and with right hand swelling concerning for frostbite, most concerning in the 4th/5th digits  Marked proximal edematous changes concerning for compartment syndrome of the hand  Plan:  No indication for fasciotomy at this time, continue active rewarming and close monitoring of R hand  No debridement at this time, will be available should non-viable tissue demarcate  Angiography unlikely to yield additional information at this time  Recommend elevation of affected extremity above the level of the heart  Would leave bullae intact, can consider drainage if they impair mobility  Consider tetanus prophylaxis  Trend CK/Cr  Management per primary    History of Present Illness     HPI:  Mignon Parham is a 34 y o  male with history of polysubstance abuse who presented via EMS altered and with right hand swelling  Per police patient was found wandering the streets intoxicated and was brought to [facility] for evaluation for concerns for his altered mental status and hypothermia/cold exposure  On arrival patient was found to be hypothermic to 93 9°, tachycardic and agitated  Surgery was consulted for evaluation of patient's right hand for concern for frostbite/compartment syndrome  Patient endorses some pain at rest that is worsened with even light pressure  He endorses some diminished sensation and restriction of movement secondary to pain/swelling  Inpatient consult to Hand Surgery     Date/Time 1/31/2019 8:00 PM     Performed by  Mayra Rodriguez     Authorized by Marly Lin              Review of Systems   Constitutional: Negative for fever  HENT: Negative  Eyes: Negative  Respiratory: Negative for shortness of breath      Cardiovascular: Negative for chest pain, palpitations and leg swelling  Gastrointestinal: Negative for abdominal pain, nausea and vomiting  Endocrine: Negative  Genitourinary: Negative  Musculoskeletal:        Right hand swelling   Skin: Positive for color change and wound  Allergic/Immunologic: Negative  Neurological: Positive for numbness  Hematological: Negative  Psychiatric/Behavioral: Negative  Historical Information   History reviewed  No pertinent past medical history  History reviewed  No pertinent surgical history  Social History   History   Alcohol Use    Yes     History   Drug Use    Types: Cocaine, Methamphetamines     History   Smoking Status    Never Smoker   Smokeless Tobacco    Never Used     Family History: non-contributory    Meds/Allergies   all current active meds have been reviewed  No Known Allergies    Objective   First Vitals:   Blood Pressure: 163/93 (01/31/19 0338)  Pulse: (!) 121 (01/31/19 0338)  Temperature: (!) 93 9 °F (34 4 °C) (01/31/19 0338)  Temp Source: Rectal (01/31/19 0338)  Respirations: 21 (01/31/19 0338)  Height: 5' 9" (175 3 cm) (01/31/19 0338)  Weight - Scale: 68 kg (150 lb) (01/31/19 0338)  SpO2: 96 % (01/31/19 0338)    Current Vitals:   Blood Pressure: 134/67 (01/31/19 1500)  Pulse: 105 (01/31/19 1500)  Temperature: 98 5 °F (36 9 °C) (01/31/19 1500)  Temp Source: Oral (01/31/19 1500)  Respirations: 19 (01/31/19 1500)  Height: 5' 7" (170 2 cm) (01/31/19 1246)  Weight - Scale: 68 kg (150 lb) (01/31/19 0338)  SpO2: 99 % (01/31/19 1500)      Intake/Output Summary (Last 24 hours) at 01/31/19 1552  Last data filed at 01/31/19 0530   Gross per 24 hour   Intake             2000 ml   Output                0 ml   Net             2000 ml       Invasive Devices     Peripheral Intravenous Line            Peripheral IV 01/31/19 Left Antecubital less than 1 day                Physical Exam   Constitutional: He is oriented to person, place, and time   He appears well-developed and well-nourished  No distress  HENT:   Head: Normocephalic and atraumatic  Left Ear: External ear normal    Skin blister of right posterior auricle   Cardiovascular: Normal rate and regular rhythm  R radial/ulnar palpable pulses  Good capillary refill throughout  Pulmonary/Chest: Effort normal  No respiratory distress  Abdominal: Soft  He exhibits no distension  There is no tenderness  There is no rebound and no guarding  Musculoskeletal: He exhibits edema  R dorsal hand swelling  Minimally tender in the dorsum of the hand  Increased pain w/ palpation of finger tips during capillary refill testing  ROM restricted secondary to pain/swelling but moving all digits  Neurological: He is alert and oriented to person, place, and time  Skin:   Right hand warm, 4th/5th digits cool  5th digit with blistering evident  As pictured below  Psychiatric: He has a normal mood and affect  Lab Results:   CBC:   Lab Results   Component Value Date    WBC 16 34 (H) 01/31/2019    HGB 15 3 01/31/2019    HCT 49 5 (H) 01/31/2019    MCV 80 (L) 01/31/2019     01/31/2019    MCH 24 6 (L) 01/31/2019    MCHC 30 9 (L) 01/31/2019    RDW 13 6 01/31/2019    MPV 11 1 01/31/2019    NRBC 0 01/31/2019   , CMP:   Lab Results   Component Value Date    SODIUM 135 (L) 01/31/2019    K 3 8 01/31/2019     01/31/2019    CO2 25 01/31/2019    BUN 24 01/31/2019    CREATININE 1 37 (H) 01/31/2019    CALCIUM 8 9 01/31/2019    AST 58 (H) 01/31/2019    ALT 26 01/31/2019    ALKPHOS 107 01/31/2019    EGFR 79 01/31/2019     Lab Results   Component Value Date    CKTOTAL 1,656 (H) 01/31/2019    CKMB 8 4 (H) 01/31/2019    CKMBINDEX <1 0 01/31/2019    TROPONINI <0 02 01/31/2019       Imaging: I have personally reviewed pertinent reports  and I have personally reviewed pertinent films in PACS  XR hand 3+ vw right   Final Result by Daniel Gamez DO (01/31 2866)      No acute osseous abnormality  Workstation performed: [id number]         XR chest 1 view portable   ED Interpretation by Sary Walden MD (01/31 0272)   No acute disease      Final Result by Greg Fontana DO (01/31 0720)      No acute cardiopulmonary disease              Workstation performed: [id number]

## 2019-02-01 LAB
ANION GAP SERPL CALCULATED.3IONS-SCNC: 7 MMOL/L (ref 4–13)
BUN SERPL-MCNC: 13 MG/DL (ref 5–25)
CALCIUM SERPL-MCNC: 8.4 MG/DL (ref 8.3–10.1)
CHLORIDE SERPL-SCNC: 102 MMOL/L (ref 100–108)
CK MB SERPL-MCNC: 3.9 NG/ML (ref 0–5)
CK MB SERPL-MCNC: <1 % (ref 0–2.5)
CK SERPL-CCNC: 784 U/L (ref 39–308)
CO2 SERPL-SCNC: 25 MMOL/L (ref 21–32)
CREAT SERPL-MCNC: 1.01 MG/DL (ref 0.6–1.3)
ERYTHROCYTE [DISTWIDTH] IN BLOOD BY AUTOMATED COUNT: 12.8 % (ref 11.6–15.1)
GFR SERPL CREATININE-BSD FRML MDRD: 114 ML/MIN/1.73SQ M
GLUCOSE SERPL-MCNC: 98 MG/DL (ref 65–140)
HCT VFR BLD AUTO: 38.2 % (ref 36.5–49.3)
HGB BLD-MCNC: 12.1 G/DL (ref 12–17)
LDH SERPL-CCNC: 200 U/L (ref 81–234)
MCH RBC QN AUTO: 24.7 PG (ref 26.8–34.3)
MCHC RBC AUTO-ENTMCNC: 31.7 G/DL (ref 31.4–37.4)
MCV RBC AUTO: 78 FL (ref 82–98)
PLATELET # BLD AUTO: 228 THOUSANDS/UL (ref 149–390)
PMV BLD AUTO: 10.8 FL (ref 8.9–12.7)
POTASSIUM SERPL-SCNC: 3.7 MMOL/L (ref 3.5–5.3)
RBC # BLD AUTO: 4.9 MILLION/UL (ref 3.88–5.62)
SODIUM SERPL-SCNC: 134 MMOL/L (ref 136–145)
WBC # BLD AUTO: 9.16 THOUSAND/UL (ref 4.31–10.16)

## 2019-02-01 PROCEDURE — 90715 TDAP VACCINE 7 YRS/> IM: CPT | Performed by: INTERNAL MEDICINE

## 2019-02-01 PROCEDURE — 82553 CREATINE MB FRACTION: CPT | Performed by: INTERNAL MEDICINE

## 2019-02-01 PROCEDURE — 83615 LACTATE (LD) (LDH) ENZYME: CPT | Performed by: INTERNAL MEDICINE

## 2019-02-01 PROCEDURE — 99231 SBSQ HOSP IP/OBS SF/LOW 25: CPT | Performed by: SURGERY

## 2019-02-01 PROCEDURE — 0H9FXZZ DRAINAGE OF RIGHT HAND SKIN, EXTERNAL APPROACH: ICD-10-PCS | Performed by: HOSPITALIST

## 2019-02-01 PROCEDURE — 80048 BASIC METABOLIC PNL TOTAL CA: CPT | Performed by: INTERNAL MEDICINE

## 2019-02-01 PROCEDURE — 82550 ASSAY OF CK (CPK): CPT | Performed by: INTERNAL MEDICINE

## 2019-02-01 PROCEDURE — 85027 COMPLETE CBC AUTOMATED: CPT | Performed by: INTERNAL MEDICINE

## 2019-02-01 RX ORDER — CEPHALEXIN 500 MG/1
500 CAPSULE ORAL EVERY 6 HOURS SCHEDULED
Status: DISCONTINUED | OUTPATIENT
Start: 2019-02-01 | End: 2019-02-04 | Stop reason: HOSPADM

## 2019-02-01 RX ADMIN — OXYCODONE HYDROCHLORIDE 10 MG: 10 TABLET ORAL at 15:56

## 2019-02-01 RX ADMIN — TETANUS TOXOID, REDUCED DIPHTHERIA TOXOID AND ACELLULAR PERTUSSIS VACCINE, ADSORBED 0.5 ML: 5; 2.5; 8; 8; 2.5 SUSPENSION INTRAMUSCULAR at 14:05

## 2019-02-01 RX ADMIN — SODIUM CHLORIDE, SODIUM GLUCONATE, SODIUM ACETATE, POTASSIUM CHLORIDE, MAGNESIUM CHLORIDE, SODIUM PHOSPHATE, DIBASIC, AND POTASSIUM PHOSPHATE 125 ML/HR: .53; .5; .37; .037; .03; .012; .00082 INJECTION, SOLUTION INTRAVENOUS at 17:54

## 2019-02-01 RX ADMIN — NITROGLYCERIN 0.5 INCH: 20 OINTMENT TOPICAL at 09:14

## 2019-02-01 RX ADMIN — OXYCODONE HYDROCHLORIDE 10 MG: 10 TABLET ORAL at 06:04

## 2019-02-01 RX ADMIN — ACETAMINOPHEN 650 MG: 325 TABLET, FILM COATED ORAL at 21:28

## 2019-02-01 RX ADMIN — CEPHALEXIN 500 MG: 500 CAPSULE ORAL at 17:54

## 2019-02-01 RX ADMIN — SODIUM CHLORIDE, SODIUM GLUCONATE, SODIUM ACETATE, POTASSIUM CHLORIDE, MAGNESIUM CHLORIDE, SODIUM PHOSPHATE, DIBASIC, AND POTASSIUM PHOSPHATE 125 ML/HR: .53; .5; .37; .037; .03; .012; .00082 INJECTION, SOLUTION INTRAVENOUS at 09:14

## 2019-02-01 RX ADMIN — NITROGLYCERIN 0.5 INCH: 20 OINTMENT TOPICAL at 05:45

## 2019-02-01 RX ADMIN — NITROGLYCERIN 0.5 INCH: 20 OINTMENT TOPICAL at 01:00

## 2019-02-01 RX ADMIN — NITROGLYCERIN 0.5 INCH: 20 OINTMENT TOPICAL at 14:08

## 2019-02-01 RX ADMIN — ACETAMINOPHEN 650 MG: 325 TABLET, FILM COATED ORAL at 00:40

## 2019-02-01 NOTE — ASSESSMENT & PLAN NOTE
Compartments soft, blistering on several fingers    Diet as tolerated  IVF  Trend CK  Monitor compartments-no current concern for compartment syndrome  May need un edith of blisters today-local wound care  Primary per [facility]

## 2019-02-01 NOTE — PROGRESS NOTES
Patient's family insistent up on antibiotics for treatment of his skin lesions from frostbite  It was heavily discussed with the patient and family there is no proof that antibiotics will assist in the treatment Frost bite and there are more risks involved including antibiotic resistance, GI adverse effects, and C diff infection  Despite this family was insistent upon antibiotics, so Keflex has been ordered for remainder of patient's hospitalization

## 2019-02-01 NOTE — UTILIZATION REVIEW
Initial Clinical Review    Admission: Date/Time/Statement: 1/31/19 @ 1647   Patient initially admitted as observation on 1/31/2019 at 0456 and changed to inpatient on 1/31/2019 at 1648 as it was medically necessary to keep the patient as inpatient   ED: Date/Time/Mode of Arrival:   ED Arrival Information     Expected Arrival 70 Damon Torres of Arrival Escorted By Service Admission Type    1/31/2019 03:30 1/31/2019 03:35 Emergent Ambulance [facility]) General Medicine Emergency    Arrival Complaint    Altered Mental Status        Chief Complaint:   Chief Complaint   Patient presents with    Altered Mental Status     Per EMS, patient found by police wandering around outside in the cold and altered  Patient admitted to snorting cocaine per EMS  Patient c/o ear pain and cold   Cold Exposure     History of Illness: 34 yr old male found by police -- initially thought to have snorted cocaine but then said he injected meth  --confused after injecting  Body temp 93 9 and tachy  C/o pain and swelling of  right hand  Altered mental state    ED Vital Signs:   ED Triage Vitals [01/31/19 0338]   Temperature Pulse Respirations Blood Pressure SpO2   (!) 93 9 °F (34 4 °C) (!) 121 21 163/93 96 %      Temp Source Heart Rate Source Patient Position - Orthostatic VS BP Location FiO2 (%)   Rectal Monitor Lying Right arm --      Pain Score       2        Wt Readings from Last 1 Encounters:   01/31/19 68 kg (150 lb)     Vital Signs (abnormal): 93 9  Pertinent Labs/Diagnostic Test Results: ck 1656--drug screen + amphetamines and benzos  Na 135--k+ 5 4--bun 28- cr 2 00--bs 144--ca 10 4--ast 58-- t   Pro 9 2  Wbc 16 34--hct 49 5  ED Treatment:   Medication Administration from 01/31/2019 0335 to 01/31/2019 1246       Date/Time Order Dose Route Action Action by Comments     01/31/2019 0403 diazepam (VALIUM) injection 10 mg 10 mg Intravenous Given Marcio Schwarz RN      01/31/2019 6814 multi-electrolyte (ISOLYTE-S PH 7 4) bolus 2,000 mL 0 mL Intravenous Stopped Roro Peoples RN      01/31/2019 0404 multi-electrolyte (ISOLYTE-S PH 7 4) bolus 2,000 mL 2,000 mL Intravenous New Bag Roro Peoples RN Warm fluids     01/31/2019 0548 oxymetazoline (AFRIN) 0 05 % nasal spray 1 spray 1 spray Each Nare Given Roro Peoples RN      01/31/2019 0557 acetaminophen (TYLENOL) tablet 650 mg 650 mg Oral Given Roro Peoples RN      01/31/2019 0615 multi-electrolyte (ISOLYTE-S PH 7 4 equivalent) IV solution 125 mL/hr Intravenous New Bag Roro Peoples RN Warm        Past Medical/Surgical History: Active Ambulatory Problems     Diagnosis Date Noted    No Active Ambulatory Problems     Resolved Ambulatory Problems     Diagnosis Date Noted    No Resolved Ambulatory Problems     No Additional Past Medical History     Admitting Diagnosis: Cocaine intoxication ([facility] [address] ) [F14 929]  Altered mental status [R41 82]  LORETTA (acute kidney injury) ([facility] [address] ) [N17 9]  Cold injury, initial encounter Dennie Lento  9XXA]  Hypothermia, initial encounter [T68  XXXA]  Age/Sex: 34 y o  male  Assessment/Plan:   Hypothermia 9 39  rewring with carol hugger and blankets-- ekg showing elevation of the j point  St   Continue iv fluids    Substance abuse -- drug screen + amphetamines  telm  Valium given in ed  loretta-- creatinine elevated to 2 check ck  Admission Orders:  Scheduled Meds:   Current Facility-Administered Medications:  acetaminophen 650 mg Oral Q6H PRN Linell Brass, DO    multi-electrolyte 125 mL/hr Intravenous Continuous Wale Mcclure MD Last Rate: 125 mL/hr (02/01/19 0914)   nitroglycerin 0 5 inch Topical Q4H Anil Silva, DO    oxyCODONE 10 mg Oral Q8H PRN Linell Brass, DO    oxyCODONE 5 mg Oral Q6H PRN Anil Silva, DO      Continuous Infusions:   multi-electrolyte 125 mL/hr Last Rate: 125 mL/hr (02/01/19 0914)     PRN Meds:   acetaminophen    oxyCODONE    oxyCODONE     Plastic consult -- concern for compartment syndrome in hand -- drg of blisters and wrapping with xeroform gauze--monitor compartmentsd - may need unroofing of blisters today2/1

## 2019-02-01 NOTE — PLAN OF CARE
CARDIOVASCULAR - ADULT     Maintains optimal cardiac output and hemodynamic stability Progressing     Absence of cardiac dysrhythmias or at baseline rhythm Progressing        DISCHARGE PLANNING     Discharge to home or other facility with appropriate resources Progressing        INFECTION - ADULT     Absence or prevention of progression during hospitalization Progressing        Knowledge Deficit     Patient/family/caregiver demonstrates understanding of disease process, treatment plan, medications, and discharge instructions Progressing        PAIN - ADULT     Verbalizes/displays adequate comfort level or baseline comfort level Progressing        SAFETY ADULT     Patient will remain free of falls Progressing        SKIN/TISSUE INTEGRITY - ADULT     Skin integrity remains intact Progressing     Incision(s), wounds(s) or drain site(s) healing without S/S of infection Progressing

## 2019-02-01 NOTE — SOCIAL WORK
CM met with patient and explained cm role  Pt alert and oriented  Pt reports he lives in 2 story home with family with 1 thomas  Denies DME, VNA, and rehab  Pt reports being independent PTA, reports good support from family, he drives and will transport home with girlfriend for d/c  Pts pharmacy is [facility] in [place]  No POA  Reports hx of drugs w/last use 1/31, Etoh last week, admission for psych/mental health 4-5 years ago in a [facility]     CM discussed Host with patient, per pt he would like to speak with them about programs  CM contacted Host and spoke with Nicol Mccullough, per Nicol Mccullough they will be out to see him by tomorrow (Saturday)  CM reviewed d/c planning process including the following: identifying help at home, patient preference for d/c planning needs, Discharge Lounge, [facility] Meds to Bed program, availability of treatment team to discuss questions or concerns patient and/or family may have regarding understanding medications and recognizing signs and symptoms once discharged  CM also encouraged patient to follow up with all recommended appointments after discharge  Patient advised of importance for patient and family to participate in managing patients medical well being

## 2019-02-01 NOTE — QUICK NOTE
Topical nitropaste DC day as patient had undergone de-edith and decompression of 4th and 5th digit blisters to day  Extremities well perfused at this time    Continue local wound care

## 2019-02-01 NOTE — PROGRESS NOTES
Progress Note Gypsy Manzanares 1985, 34 y o  male MRN: [id number]    Unit/Bed#: [facility] 425-01 Encounter: [id number]    Primary Care Provider: No primary care provider on file  Date and time admitted to hospital: 1/31/2019  3:35 AM        Frostbite of hand, right   Assessment & Plan    Compartments soft, blistering on several fingers    Diet as tolerated  IVF  Trend CK  Monitor compartments-no current concern for compartment syndrome  May need un edith of blisters today-local wound care  Primary per SLIM                 Subjective/Objective   Chief Complaint:     Subjective: right hand pain over blisters, no arm pain  Increased ROM of right hand since yesterday  Objective:     Blood pressure 120/61, pulse (!) 112, temperature 98 1 °F (36 7 °C), temperature source Oral, resp  rate 20, height 5' 7" (1 702 m), weight 68 kg (150 lb), SpO2 98 %  ,Body mass index is 23 49 kg/m²  Intake/Output Summary (Last 24 hours) at 02/01/19 0531  Last data filed at 01/31/19 2320   Gross per 24 hour   Intake          2615 41 ml   Output                0 ml   Net          2615 41 ml       Invasive Devices     Peripheral Intravenous Line            Peripheral IV 01/31/19 Left Antecubital 1 day                Physical Exam: NAD  AAOX3  Normal respiratory effort  Soft, NT, ND  Right forearm soft, +2 radial  Right hand 4th and 5th digit with large serous blisters      Lab, Imaging and other studies:  I have personally reviewed pertinent lab results    , CBC: No results found for: WBC, HGB, HCT, MCV, PLT, ADJUSTEDWBC, MCH, MCHC, RDW, MPV, NRBC, CMP:   Lab Results   Component Value Date    SODIUM 135 (L) 01/31/2019    K 3 8 01/31/2019     01/31/2019    CO2 25 01/31/2019    BUN 24 01/31/2019    CREATININE 1 37 (H) 01/31/2019    CALCIUM 8 9 01/31/2019    EGFR 79 01/31/2019   VTE Mechanical Prophylaxis: sequential compression device

## 2019-02-01 NOTE — PROGRESS NOTES
IM Residency Progress Note   Unit/Bed#: [facility] 425-01 Encounter: [id number]  SOD Team A      Stephanie Vandana 34 y o  male  [facility] Stay Days: 1      Assessment/Plan:    Principal Problem:    Amphetamine intoxication ([address] )  Active Problems:    Mild hypothermia     Substance abuse ([address] )    Tachycardia    Frostbite of right ear    Frostbite of hand, right    Amphetamine intoxication  - symptomatic support  - continue telemetry  - continue to trend CK and BMP one more day    Drug abuse  - history of heroin, cocaine, amphetamine abuse    Hypothermia - resolved    Frostbite of R hand and R ear  - large blisters from exposure  - R ear blister already unroofed  - initial concern for compartment syndrome due to cyanosis and paresthesias/numbness of right fingers, much improved today  - plastic surgery consulted, nitro paste has been applied to both with plans for possible unroofing of blisters of right hand    Disposition:  Continue inpatient care       Subjective:   Patient with no acute events overnight, heart rate decreasing  Denies fever, chills, chest pain, shortness of breath, abd pain, N/V/D, or worsening pain of his right hand  Concerned that swelling of his right forearm is increasing  Vitals: Temp (24hrs), Av 1 °F (36 7 °C), Min:97 6 °F (36 4 °C), Max:98 5 °F (36 9 °C)  Current: Temperature: 97 9 °F (36 6 °C)  Vitals:    19 2300 19 0300 19 0728 19 1121   BP: 119/58 120/61 106/55 121/60   BP Location: Left arm Left arm Right arm Right arm   Pulse: 93 (!) 112 73 99   Resp: 20 20 18 18   Temp: 97 6 °F (36 4 °C) 98 1 °F (36 7 °C) 97 9 °F (36 6 °C) 97 9 °F (36 6 °C)   TempSrc: Oral Oral Oral Oral   SpO2: 98% 98% 96% 95%   Weight:       Height:        Body mass index is 23 49 kg/m²  I/O last 24 hours: In: 3095 4 [P O :960; I V :2135 4]  Out: -     Physical Exam   Constitutional: He is oriented to person, place, and time  He appears well-developed and well-nourished   No distress  Thin   HENT:   Head: Normocephalic and atraumatic  Ears:    Eyes: Conjunctivae and EOM are normal  Right eye exhibits no discharge  Left eye exhibits no discharge  No scleral icterus  Cardiovascular: Normal rate, regular rhythm and normal heart sounds  Exam reveals no friction rub  No murmur heard  Pulmonary/Chest: Effort normal and breath sounds normal  No respiratory distress  He has no wheezes  He has no rales  Musculoskeletal: He exhibits no edema, tenderness or deformity  Arms:  Neurological: He is alert and oriented to person, place, and time  No cranial nerve deficit  Coordination normal    Skin: Skin is warm and dry  No rash noted  He is not diaphoretic  No erythema  Psychiatric: He has a normal mood and affect  His behavior is normal  Judgment and thought content normal    Nursing note and vitals reviewed          Invasive Devices     Peripheral Intravenous Line            Peripheral IV 01/31/19 Left Antecubital 1 day                          Labs:   Recent Results (from the past 24 hour(s))   Basic metabolic panel    Collection Time: 02/01/19  5:08 AM   Result Value Ref Range    Sodium 134 (L) 136 - 145 mmol/L    Potassium 3 7 3 5 - 5 3 mmol/L    Chloride 102 100 - 108 mmol/L    CO2 25 21 - 32 mmol/L    ANION GAP 7 4 - 13 mmol/L    BUN 13 5 - 25 mg/dL    Creatinine 1 01 0 60 - 1 30 mg/dL    Glucose 98 65 - 140 mg/dL    Calcium 8 4 8 3 - 10 1 mg/dL    eGFR 114 ml/min/1 73sq m   CBC and Platelet    Collection Time: 02/01/19  5:08 AM   Result Value Ref Range    WBC 9 16 4 31 - 10 16 Thousand/uL    RBC 4 90 3 88 - 5 62 Million/uL    Hemoglobin 12 1 12 0 - 17 0 g/dL    Hematocrit 38 2 36 5 - 49 3 %    MCV 78 (L) 82 - 98 fL    MCH 24 7 (L) 26 8 - 34 3 pg    MCHC 31 7 31 4 - 37 4 g/dL    RDW 12 8 11 6 - 15 1 %    Platelets 249 887 - 487 Thousands/uL    MPV 10 8 8 9 - 12 7 fL   CK (with reflex to MB)    Collection Time: 02/01/19  5:08 AM   Result Value Ref Range    Total  (H) 39 - 308 U/L   CKMB    Collection Time: 02/01/19  5:08 AM   Result Value Ref Range    CK-MB Index <1 0 0 0 - 2 5 %    CK-MB 3 9 0 0 - 5 0 ng/mL   Lactate dehydrogenase    Collection Time: 02/01/19  9:38 AM   Result Value Ref Range     81 - 234 U/L       Radiology Results: I have personally reviewed pertinent reports  Other Diagnostic Testing:   I have personally reviewed pertinent reports          Active Meds:   Current Facility-Administered Medications   Medication Dose Route Frequency    acetaminophen (TYLENOL) tablet 650 mg  650 mg Oral Q6H PRN    multi-electrolyte (ISOLYTE-S PH 7 4 equivalent) IV solution  125 mL/hr Intravenous Continuous    nitroglycerin (NITRO-BID) 2 % TD ointment 0 5 inch  0 5 inch Topical Q4H    oxyCODONE (ROXICODONE) immediate release tablet 10 mg  10 mg Oral Q8H PRN    oxyCODONE (ROXICODONE) IR tablet 5 mg  5 mg Oral Q6H PRN    tetanus-diphtheria-acellular pertussis (BOOSTRIX) IM injection 0 5 mL  0 5 mL Intramuscular Once         VTE Pharmacologic Prophylaxis: Sequential compression device (Venodyne)   VTE Mechanical Prophylaxis: sequential compression device    Deena Soares DO

## 2019-02-01 NOTE — PROGRESS NOTES
Plastic surgery update    Right hand 4th and 5th digit blisters tense and limiting ROM  Area prepped with betadine and an 18 guage needle was used to decompress the blisters  Serous output from both  Areas wrapped in xeroform and kristina wrap  Nursing to change daily      Emmie Clarke MD  PGY-2 General Surgery

## 2019-02-02 LAB
ANION GAP SERPL CALCULATED.3IONS-SCNC: 3 MMOL/L (ref 4–13)
BUN SERPL-MCNC: 9 MG/DL (ref 5–25)
CALCIUM SERPL-MCNC: 8.3 MG/DL (ref 8.3–10.1)
CHLORIDE SERPL-SCNC: 106 MMOL/L (ref 100–108)
CK MB SERPL-MCNC: 2.2 NG/ML (ref 0–5)
CK MB SERPL-MCNC: <1 % (ref 0–2.5)
CK SERPL-CCNC: 358 U/L (ref 39–308)
CO2 SERPL-SCNC: 27 MMOL/L (ref 21–32)
CREAT SERPL-MCNC: 0.92 MG/DL (ref 0.6–1.3)
ERYTHROCYTE [DISTWIDTH] IN BLOOD BY AUTOMATED COUNT: 13.2 % (ref 11.6–15.1)
GFR SERPL CREATININE-BSD FRML MDRD: 128 ML/MIN/1.73SQ M
GLUCOSE SERPL-MCNC: 91 MG/DL (ref 65–140)
HCT VFR BLD AUTO: 38.3 % (ref 36.5–49.3)
HGB BLD-MCNC: 12.2 G/DL (ref 12–17)
LACTATE SERPL-SCNC: 0.7 MMOL/L (ref 0.5–2)
MCH RBC QN AUTO: 25.2 PG (ref 26.8–34.3)
MCHC RBC AUTO-ENTMCNC: 31.9 G/DL (ref 31.4–37.4)
MCV RBC AUTO: 79 FL (ref 82–98)
PLATELET # BLD AUTO: 230 THOUSANDS/UL (ref 149–390)
PMV BLD AUTO: 10.6 FL (ref 8.9–12.7)
POTASSIUM SERPL-SCNC: 3.8 MMOL/L (ref 3.5–5.3)
RBC # BLD AUTO: 4.84 MILLION/UL (ref 3.88–5.62)
SODIUM SERPL-SCNC: 136 MMOL/L (ref 136–145)
WBC # BLD AUTO: 6.25 THOUSAND/UL (ref 4.31–10.16)

## 2019-02-02 PROCEDURE — 99232 SBSQ HOSP IP/OBS MODERATE 35: CPT | Performed by: HOSPITALIST

## 2019-02-02 PROCEDURE — 0H9FXZZ DRAINAGE OF RIGHT HAND SKIN, EXTERNAL APPROACH: ICD-10-PCS | Performed by: HOSPITALIST

## 2019-02-02 PROCEDURE — 82550 ASSAY OF CK (CPK): CPT | Performed by: INTERNAL MEDICINE

## 2019-02-02 PROCEDURE — 82553 CREATINE MB FRACTION: CPT | Performed by: INTERNAL MEDICINE

## 2019-02-02 PROCEDURE — 85027 COMPLETE CBC AUTOMATED: CPT | Performed by: INTERNAL MEDICINE

## 2019-02-02 PROCEDURE — 80048 BASIC METABOLIC PNL TOTAL CA: CPT | Performed by: INTERNAL MEDICINE

## 2019-02-02 PROCEDURE — 83605 ASSAY OF LACTIC ACID: CPT | Performed by: INTERNAL MEDICINE

## 2019-02-02 RX ORDER — BACITRACIN, NEOMYCIN, POLYMYXIN B 400; 3.5; 5 [USP'U]/G; MG/G; [USP'U]/G
1 OINTMENT TOPICAL 2 TIMES DAILY
Status: DISCONTINUED | OUTPATIENT
Start: 2019-02-02 | End: 2019-02-04 | Stop reason: HOSPADM

## 2019-02-02 RX ADMIN — CEPHALEXIN 500 MG: 500 CAPSULE ORAL at 17:37

## 2019-02-02 RX ADMIN — SODIUM CHLORIDE, SODIUM GLUCONATE, SODIUM ACETATE, POTASSIUM CHLORIDE, MAGNESIUM CHLORIDE, SODIUM PHOSPHATE, DIBASIC, AND POTASSIUM PHOSPHATE 125 ML/HR: .53; .5; .37; .037; .03; .012; .00082 INJECTION, SOLUTION INTRAVENOUS at 18:33

## 2019-02-02 RX ADMIN — CEPHALEXIN 500 MG: 500 CAPSULE ORAL at 00:16

## 2019-02-02 RX ADMIN — ACETAMINOPHEN 650 MG: 325 TABLET, FILM COATED ORAL at 07:56

## 2019-02-02 RX ADMIN — CEPHALEXIN 500 MG: 500 CAPSULE ORAL at 06:20

## 2019-02-02 RX ADMIN — CEPHALEXIN 500 MG: 500 CAPSULE ORAL at 11:28

## 2019-02-02 RX ADMIN — BACITRACIN ZINC, NEOMYCIN SULFATE, AND POLYMYXIN B SULFATE 1 SMALL APPLICATION: 400; 3.5; 5 OINTMENT TOPICAL at 14:37

## 2019-02-02 RX ADMIN — BACITRACIN ZINC, NEOMYCIN SULFATE, AND POLYMYXIN B SULFATE 1 SMALL APPLICATION: 400; 3.5; 5 OINTMENT TOPICAL at 17:38

## 2019-02-02 RX ADMIN — OXYCODONE HYDROCHLORIDE 5 MG: 5 TABLET ORAL at 00:16

## 2019-02-02 RX ADMIN — OXYCODONE HYDROCHLORIDE 5 MG: 5 TABLET ORAL at 07:54

## 2019-02-02 RX ADMIN — SODIUM CHLORIDE, SODIUM GLUCONATE, SODIUM ACETATE, POTASSIUM CHLORIDE, MAGNESIUM CHLORIDE, SODIUM PHOSPHATE, DIBASIC, AND POTASSIUM PHOSPHATE 125 ML/HR: .53; .5; .37; .037; .03; .012; .00082 INJECTION, SOLUTION INTRAVENOUS at 03:00

## 2019-02-02 RX ADMIN — OXYCODONE HYDROCHLORIDE 5 MG: 5 TABLET ORAL at 16:38

## 2019-02-02 NOTE — PROGRESS NOTES
Plastic surgery update    Right hand 4th and 5th digits blisters re-accumulated  Re-prepped with betadine, and opened with longer incision to allow drainage  Patient tolerated procedure well  Nursing to re-dress       Darryll Mcardle, MD

## 2019-02-02 NOTE — PROGRESS NOTES
IM Residency Progress Note   Unit/Bed#: [facility] 425-01 Encounter: [id number]  SOD Team A      Beth Harrison 34 y o  male  [facility] Stay Days: 2      Assessment/Plan:    Principal Problem:    Amphetamine intoxication ([address] )  Active Problems:    Mild hypothermia     Substance abuse ([address] )    Tachycardia    Frostbite of right ear    Frostbite of hand, right    Amphetamine intoxication - resolved  - telemetry DC'd  - continue 100mL/hr isolyte, consider dc'ing  - case management working with patient on host referral, patient interested in treatment    Substance abuse  - post referral pending as above, per case management notes patient is supposed to be seen today  - minimize pain medication, do not escalate narcotics    Tachycardia - resolved  - secondary to methamphetamine intoxication  - dc telemetry    Frostbite  - previous blisters on pinna of right ear and right 4th and 5th fingers have been unroofed, right ear ocurred on its own  - nitropaste DC'd  - plastic surgery following, aspirated fluid from right 4th and 5th fingers yesterday, smaller blisters on right 3rd finger remains  - routine wound care with normal saline rinse and covering with antibiotic ointment and sterile gauze  - pt received tdap    Disposition:  Continue inpatient care       Subjective:   Patient denies any acute events overnight  Reports decreased sensation but increasing pain in the tips of his right fingers         Vitals: Temp (24hrs), Av 2 °F (36 8 °C), Min:97 5 °F (36 4 °C), Max:98 6 °F (37 °C)  Current: Temperature: 98 4 °F (36 9 °C)  Vitals:    19 2356 19 0358 19 0745 19 1139   BP: 106/59 114/60 128/60 125/82   BP Location: Right arm Right arm Left arm Left arm   Pulse: 82 70 85 86   Resp: 18 18 18 18   Temp: 97 9 °F (36 6 °C) 97 5 °F (36 4 °C) 98 6 °F (37 °C) 98 4 °F (36 9 °C)   TempSrc: Oral Oral Oral Oral   SpO2: 95% 98% 98% 98%   Weight:       Height:        Body mass index is 23 49 kg/m²  I/O last 24 hours: In: 2530 [P O :1280; I V :1250]  Out: 0       Physical Exam   Constitutional: He is oriented to person, place, and time  He appears well-developed and well-nourished  No distress  HENT:   Head: Normocephalic and atraumatic  Eyes: Conjunctivae and EOM are normal  Right eye exhibits no discharge  Left eye exhibits no discharge  No scleral icterus  Cardiovascular: Normal rate, regular rhythm and normal heart sounds  No murmur heard  Pulmonary/Chest: Effort normal and breath sounds normal  No tachypnea  No respiratory distress  He has no wheezes  Musculoskeletal:   Right hand wrapped in gauze, small tense bullae noted on 3rd finger, fingers warm and well perfused with full range of motion   Neurological: He is alert and oriented to person, place, and time  No cranial nerve deficit  Coordination normal    Skin: Skin is warm and dry  No rash noted  He is not diaphoretic  No erythema  Psychiatric: He has a normal mood and affect  His behavior is normal  Judgment and thought content normal    Nursing note and vitals reviewed          Invasive Devices     Peripheral Intravenous Line            Peripheral IV 02/01/19 Left Arm less than 1 day                          Labs:   Recent Results (from the past 24 hour(s))   CK (with reflex to MB)    Collection Time: 02/02/19  5:17 AM   Result Value Ref Range    Total  (H) 39 - 308 U/L   Basic metabolic panel    Collection Time: 02/02/19  5:17 AM   Result Value Ref Range    Sodium 136 136 - 145 mmol/L    Potassium 3 8 3 5 - 5 3 mmol/L    Chloride 106 100 - 108 mmol/L    CO2 27 21 - 32 mmol/L    ANION GAP 3 (L) 4 - 13 mmol/L    BUN 9 5 - 25 mg/dL    Creatinine 0 92 0 60 - 1 30 mg/dL    Glucose 91 65 - 140 mg/dL    Calcium 8 3 8 3 - 10 1 mg/dL    eGFR 128 ml/min/1 73sq m   CBC    Collection Time: 02/02/19  5:17 AM   Result Value Ref Range    WBC 6 25 4 31 - 10 16 Thousand/uL    RBC 4 84 3 88 - 5 62 Million/uL    Hemoglobin 12 2 12 0 - 17 0 g/dL    Hematocrit 38 3 36 5 - 49 3 %    MCV 79 (L) 82 - 98 fL    MCH 25 2 (L) 26 8 - 34 3 pg    MCHC 31 9 31 4 - 37 4 g/dL    RDW 13 2 11 6 - 15 1 %    Platelets 774 016 - 483 Thousands/uL    MPV 10 6 8 9 - 12 7 fL   Lactic acid, plasma    Collection Time: 02/02/19  5:17 AM   Result Value Ref Range    LACTIC ACID 0 7 0 5 - 2 0 mmol/L   CKMB    Collection Time: 02/02/19  5:17 AM   Result Value Ref Range    CK-MB Index <1 0 0 0 - 2 5 %    CK-MB 2 2 0 0 - 5 0 ng/mL       Radiology Results: I have personally reviewed pertinent reports  Other Diagnostic Testing:   I have personally reviewed pertinent reports          Active Meds:   Current Facility-Administered Medications   Medication Dose Route Frequency    acetaminophen (TYLENOL) tablet 650 mg  650 mg Oral Q6H PRN    cephalexin (KEFLEX) capsule 500 mg  500 mg Oral Q6H [facility]    multi-electrolyte (ISOLYTE-S PH 7 4 equivalent) IV solution  125 mL/hr Intravenous Continuous    neomycin-bacitracin-polymyxin b (NEOSPORIN) ointment 1 small application  1 small application Topical BID    oxyCODONE (ROXICODONE) IR tablet 5 mg  5 mg Oral Q6H PRN         VTE Pharmacologic Prophylaxis: Sequential compression device (Venodyne)   VTE Mechanical Prophylaxis: sequential compression device    Marvin Staton DO

## 2019-02-03 LAB
ANION GAP SERPL CALCULATED.3IONS-SCNC: 5 MMOL/L (ref 4–13)
BUN SERPL-MCNC: 8 MG/DL (ref 5–25)
CALCIUM SERPL-MCNC: 8.5 MG/DL (ref 8.3–10.1)
CHLORIDE SERPL-SCNC: 106 MMOL/L (ref 100–108)
CK MB SERPL-MCNC: 1.2 NG/ML (ref 0–5)
CK MB SERPL-MCNC: <1 % (ref 0–2.5)
CK SERPL-CCNC: 198 U/L (ref 39–308)
CO2 SERPL-SCNC: 26 MMOL/L (ref 21–32)
CREAT SERPL-MCNC: 1.07 MG/DL (ref 0.6–1.3)
GFR SERPL CREATININE-BSD FRML MDRD: 106 ML/MIN/1.73SQ M
GLUCOSE SERPL-MCNC: 117 MG/DL (ref 65–140)
POTASSIUM SERPL-SCNC: 4 MMOL/L (ref 3.5–5.3)
SODIUM SERPL-SCNC: 137 MMOL/L (ref 136–145)

## 2019-02-03 PROCEDURE — 80048 BASIC METABOLIC PNL TOTAL CA: CPT | Performed by: INTERNAL MEDICINE

## 2019-02-03 PROCEDURE — 82550 ASSAY OF CK (CPK): CPT | Performed by: INTERNAL MEDICINE

## 2019-02-03 PROCEDURE — 99232 SBSQ HOSP IP/OBS MODERATE 35: CPT | Performed by: HOSPITALIST

## 2019-02-03 PROCEDURE — 82553 CREATINE MB FRACTION: CPT | Performed by: INTERNAL MEDICINE

## 2019-02-03 RX ORDER — IBUPROFEN 600 MG/1
600 TABLET ORAL EVERY 8 HOURS SCHEDULED
Status: DISCONTINUED | OUTPATIENT
Start: 2019-02-03 | End: 2019-02-04

## 2019-02-03 RX ORDER — KETOROLAC TROMETHAMINE 30 MG/ML
30 INJECTION, SOLUTION INTRAMUSCULAR; INTRAVENOUS ONCE
Status: COMPLETED | OUTPATIENT
Start: 2019-02-03 | End: 2019-02-03

## 2019-02-03 RX ADMIN — CEPHALEXIN 500 MG: 500 CAPSULE ORAL at 11:14

## 2019-02-03 RX ADMIN — CEPHALEXIN 500 MG: 500 CAPSULE ORAL at 05:12

## 2019-02-03 RX ADMIN — CEPHALEXIN 500 MG: 500 CAPSULE ORAL at 17:08

## 2019-02-03 RX ADMIN — OXYCODONE HYDROCHLORIDE 5 MG: 5 TABLET ORAL at 00:15

## 2019-02-03 RX ADMIN — CEPHALEXIN 500 MG: 500 CAPSULE ORAL at 00:14

## 2019-02-03 RX ADMIN — BACITRACIN ZINC, NEOMYCIN SULFATE, AND POLYMYXIN B SULFATE 1 SMALL APPLICATION: 400; 3.5; 5 OINTMENT TOPICAL at 08:43

## 2019-02-03 RX ADMIN — SODIUM CHLORIDE, SODIUM GLUCONATE, SODIUM ACETATE, POTASSIUM CHLORIDE, MAGNESIUM CHLORIDE, SODIUM PHOSPHATE, DIBASIC, AND POTASSIUM PHOSPHATE 125 ML/HR: .53; .5; .37; .037; .03; .012; .00082 INJECTION, SOLUTION INTRAVENOUS at 02:18

## 2019-02-03 RX ADMIN — BACITRACIN ZINC, NEOMYCIN SULFATE, AND POLYMYXIN B SULFATE 1 SMALL APPLICATION: 400; 3.5; 5 OINTMENT TOPICAL at 17:09

## 2019-02-03 RX ADMIN — ACETAMINOPHEN 650 MG: 325 TABLET, FILM COATED ORAL at 02:50

## 2019-02-03 RX ADMIN — IBUPROFEN 600 MG: 600 TABLET ORAL at 08:43

## 2019-02-03 RX ADMIN — SODIUM CHLORIDE, SODIUM GLUCONATE, SODIUM ACETATE, POTASSIUM CHLORIDE, MAGNESIUM CHLORIDE, SODIUM PHOSPHATE, DIBASIC, AND POTASSIUM PHOSPHATE 125 ML/HR: .53; .5; .37; .037; .03; .012; .00082 INJECTION, SOLUTION INTRAVENOUS at 10:04

## 2019-02-03 RX ADMIN — KETOROLAC TROMETHAMINE 30 MG: 30 INJECTION, SOLUTION INTRAMUSCULAR at 10:28

## 2019-02-03 RX ADMIN — IBUPROFEN 600 MG: 600 TABLET ORAL at 17:09

## 2019-02-03 NOTE — PROGRESS NOTES
IM Residency Progress Note   Unit/Bed#: [facility] 425-01 Encounter: [id number]  SOD Team A      Keo Farmer 34 y o  male  [facility] Stay Days: 3      Assessment/Plan:    Principal Problem:    Amphetamine intoxication ([facility] [address] )  Active Problems:    Mild hypothermia     Substance abuse ([address] )    Tachycardia    Frostbite of right ear    Frostbite of hand, right    Amphetamine intoxication - resolved  - telemetry DC'd  - continue 100mL/hr isolyte, consider dc'ing    Substance abuse  - post referral pending as above, per case management notes patient is supposed to be seen today  - minimize pain medication, do not escalate narcotics; started round the clock ibuprofen     Tachycardia - resolved  - secondary to methamphetamine intoxication  - dc telemetry     Frostbite  - previous blisters on pinna of right ear and right 4th and 5th fingers have been unroofed, right ear ocurred on its own  - nitropaste DC'd  - plastic surgery following, aspirated fluid from right 4th and 5th fingers yesterday, smaller blisters on right 3rd finger remains  - routine wound care with normal saline rinse and covering with antibiotic ointment and sterile gauze  - pt received tdap       Disposition: Patient continues to have pain; will be monitored for another day before considering discharge       Subjective:   Continues to complain of pain       Vitals: Temp (24hrs), Av 6 °F (37 °C), Min:98 4 °F (36 9 °C), Max:98 9 °F (37 2 °C)  Current: Temperature: 98 5 °F (36 9 °C)  Vitals:    19 1527 19 1600 19 1958 19 2300   BP: 129/64  136/70 129/65   BP Location: Left arm  Left arm Left arm   Pulse: 80  76 61   Resp: 17  17 18   Temp: 98 9 °F (37 2 °C)  98 7 °F (37 1 °C) 98 5 °F (36 9 °C)   TempSrc: Oral  Oral Oral   SpO2: 98% 98% 97% 95%   Weight:       Height:        Body mass index is 23 49 kg/m²  I/O last 24 hours: In: 2735 4 [P O :200;  I V :2535 4]  Out: 300 [Urine:300]      Physical Exam: /65 (BP Location: Left arm)   Pulse 61   Temp 98 5 °F (36 9 °C) (Oral)   Resp 18   Ht 5' 7" (1 702 m)   Wt 68 kg (150 lb)   SpO2 95%   BMI 23 49 kg/m²     General Appearance:    Alert, cooperative, no distress, appears stated age   Head:    Normocephalic, without obvious abnormality, atraumatic   Eyes:    PERRL, conjunctiva/corneas clear, EOM's intact, fundi     benign, both eyes        Ears:    Normal TM's and external ear canals, both ears   Nose:   Nares normal, septum midline, mucosa normal, no drainage    or sinus tenderness   Throat:   Lips, mucosa, and tongue normal; teeth and gums normal   Neck:   Supple, symmetrical, trachea midline, no adenopathy;        thyroid:  No enlargement/tenderness/nodules; no carotid    bruit or JVD   Back:     Symmetric, no curvature, ROM normal, no CVA tenderness   Lungs:     Clear to auscultation bilaterally, respirations unlabored   Chest wall:    No tenderness or deformity   Heart:    Regular rate and rhythm, S1 and S2 normal, no murmur, rub   or gallop   Abdomen:     Soft, non-tender, bowel sounds active all four quadrants,     no masses, no organomegaly   Genitalia:    Normal male without lesion, discharge or tenderness   Rectal:    Normal tone, normal prostate, no masses or tenderness;    guaiac negative stool   Extremities:   Extremities normal, atraumatic, no cyanosis or edema   Pulses:   2+ and symmetric all extremities   Skin:   Skin color, texture, turgor normal, no rashes or lesions   Lymph nodes:   Cervical, supraclavicular, and axillary nodes normal   Neurologic:   CNII-XII intact   Normal strength, sensation and reflexes       throughout        Invasive Devices     Peripheral Intravenous Line            Peripheral IV 02/01/19 Left Arm 1 day                          Labs:   Recent Results (from the past 24 hour(s))   Basic metabolic panel    Collection Time: 02/03/19  8:19 AM   Result Value Ref Range    Sodium 137 136 - 145 mmol/L    Potassium 4 0 3 5 - 5 3 mmol/L Chloride 106 100 - 108 mmol/L    CO2 26 21 - 32 mmol/L    ANION GAP 5 4 - 13 mmol/L    BUN 8 5 - 25 mg/dL    Creatinine 1 07 0 60 - 1 30 mg/dL    Glucose 117 65 - 140 mg/dL    Calcium 8 5 8 3 - 10 1 mg/dL    eGFR 106 ml/min/1 73sq m   CK (with reflex to MB)    Collection Time: 02/03/19  8:19 AM   Result Value Ref Range    Total  39 - 308 U/L   CKMB    Collection Time: 02/03/19  8:19 AM   Result Value Ref Range    CK-MB Index <1 0 0 0 - 2 5 %    CK-MB 1 2 0 0 - 5 0 ng/mL       Radiology Results: I have personally reviewed pertinent reports  Other Diagnostic Testing:   I have personally reviewed pertinent reports          Active Meds:   Current Facility-Administered Medications   Medication Dose Route Frequency    acetaminophen (TYLENOL) tablet 650 mg  650 mg Oral Q6H PRN    cephalexin (KEFLEX) capsule 500 mg  500 mg Oral Q6H [address]    ibuprofen (MOTRIN) tablet 600 mg  600 mg Oral Q8H [address]    multi-electrolyte (ISOLYTE-S PH 7 4 equivalent) IV solution  125 mL/hr Intravenous Continuous    neomycin-bacitracin-polymyxin b (NEOSPORIN) ointment 1 small application  1 small application Topical BID    oxyCODONE (ROXICODONE) IR tablet 5 mg  5 mg Oral Q6H PRN         VTE Pharmacologic Prophylaxis: Reason for no pharmacologic prophylaxis No indication  VTE Mechanical Prophylaxis: reason for no mechanical VTE prophylaxis No indication    Sammy Briseno MD

## 2019-02-03 NOTE — PROGRESS NOTES
SOD-CONSUELO paged two more times to clarify reordering or cancellation of telemetry  Awaiting response

## 2019-02-04 VITALS
BODY MASS INDEX: 23.54 KG/M2 | OXYGEN SATURATION: 95 % | DIASTOLIC BLOOD PRESSURE: 57 MMHG | WEIGHT: 150 LBS | HEART RATE: 70 BPM | TEMPERATURE: 98.3 F | RESPIRATION RATE: 18 BRPM | SYSTOLIC BLOOD PRESSURE: 122 MMHG | HEIGHT: 67 IN

## 2019-02-04 PROBLEM — T68.XXXA HYPOTHERMIA: Status: RESOLVED | Noted: 2019-01-31 | Resolved: 2019-02-04

## 2019-02-04 PROBLEM — N17.9 AKI (ACUTE KIDNEY INJURY) (HCC): Status: ACTIVE | Noted: 2019-02-04

## 2019-02-04 PROBLEM — R00.0 TACHYCARDIA: Status: RESOLVED | Noted: 2019-01-31 | Resolved: 2019-02-04

## 2019-02-04 PROBLEM — N17.9 AKI (ACUTE KIDNEY INJURY) (HCC): Status: RESOLVED | Noted: 2019-02-04 | Resolved: 2019-02-04

## 2019-02-04 LAB
ANION GAP SERPL CALCULATED.3IONS-SCNC: 5 MMOL/L (ref 4–13)
BASOPHILS # BLD AUTO: 0.03 THOUSANDS/ΜL (ref 0–0.1)
BASOPHILS NFR BLD AUTO: 0 % (ref 0–1)
BUN SERPL-MCNC: 13 MG/DL (ref 5–25)
CALCIUM SERPL-MCNC: 8.8 MG/DL (ref 8.3–10.1)
CHLORIDE SERPL-SCNC: 106 MMOL/L (ref 100–108)
CO2 SERPL-SCNC: 25 MMOL/L (ref 21–32)
CREAT SERPL-MCNC: 0.97 MG/DL (ref 0.6–1.3)
EOSINOPHIL # BLD AUTO: 0.24 THOUSAND/ΜL (ref 0–0.61)
EOSINOPHIL NFR BLD AUTO: 3 % (ref 0–6)
ERYTHROCYTE [DISTWIDTH] IN BLOOD BY AUTOMATED COUNT: 12.8 % (ref 11.6–15.1)
GFR SERPL CREATININE-BSD FRML MDRD: 120 ML/MIN/1.73SQ M
GLUCOSE SERPL-MCNC: 93 MG/DL (ref 65–140)
HCT VFR BLD AUTO: 43.7 % (ref 36.5–49.3)
HGB BLD-MCNC: 13.4 G/DL (ref 12–17)
IMM GRANULOCYTES # BLD AUTO: 0.01 THOUSAND/UL (ref 0–0.2)
IMM GRANULOCYTES NFR BLD AUTO: 0 % (ref 0–2)
LYMPHOCYTES # BLD AUTO: 1.25 THOUSANDS/ΜL (ref 0.6–4.47)
LYMPHOCYTES NFR BLD AUTO: 18 % (ref 14–44)
MCH RBC QN AUTO: 24.2 PG (ref 26.8–34.3)
MCHC RBC AUTO-ENTMCNC: 30.7 G/DL (ref 31.4–37.4)
MCV RBC AUTO: 79 FL (ref 82–98)
MONOCYTES # BLD AUTO: 0.45 THOUSAND/ΜL (ref 0.17–1.22)
MONOCYTES NFR BLD AUTO: 7 % (ref 4–12)
NEUTROPHILS # BLD AUTO: 4.99 THOUSANDS/ΜL (ref 1.85–7.62)
NEUTS SEG NFR BLD AUTO: 72 % (ref 43–75)
NRBC BLD AUTO-RTO: 0 /100 WBCS
PLATELET # BLD AUTO: 316 THOUSANDS/UL (ref 149–390)
PMV BLD AUTO: 11.2 FL (ref 8.9–12.7)
POTASSIUM SERPL-SCNC: 4.4 MMOL/L (ref 3.5–5.3)
RBC # BLD AUTO: 5.53 MILLION/UL (ref 3.88–5.62)
SODIUM SERPL-SCNC: 136 MMOL/L (ref 136–145)
WBC # BLD AUTO: 6.97 THOUSAND/UL (ref 4.31–10.16)

## 2019-02-04 PROCEDURE — 85025 COMPLETE CBC W/AUTO DIFF WBC: CPT | Performed by: INTERNAL MEDICINE

## 2019-02-04 PROCEDURE — 80048 BASIC METABOLIC PNL TOTAL CA: CPT | Performed by: INTERNAL MEDICINE

## 2019-02-04 PROCEDURE — 99238 HOSP IP/OBS DSCHRG MGMT 30/<: CPT | Performed by: INTERNAL MEDICINE

## 2019-02-04 RX ORDER — KETOROLAC TROMETHAMINE 10 MG/1
10 TABLET, FILM COATED ORAL EVERY 6 HOURS PRN
Status: DISCONTINUED | OUTPATIENT
Start: 2019-02-04 | End: 2019-02-04

## 2019-02-04 RX ORDER — COMPR.STOCKING,KNEE,LONG,LARGE
1 EACH MISCELLANEOUS DAILY
Qty: 1 EACH | Refills: 0 | Status: ON HOLD | OUTPATIENT
Start: 2019-02-04 | End: 2019-07-27 | Stop reason: ALTCHOICE

## 2019-02-04 RX ORDER — KETOROLAC TROMETHAMINE 10 MG/1
10 TABLET, FILM COATED ORAL EVERY 6 HOURS PRN
Qty: 4 TABLET | Refills: 0 | Status: SHIPPED | OUTPATIENT
Start: 2019-02-04 | End: 2019-07-27 | Stop reason: HOSPADM

## 2019-02-04 RX ORDER — KETOROLAC TROMETHAMINE 10 MG/1
10 TABLET, FILM COATED ORAL EVERY 6 HOURS PRN
Qty: 10 TABLET | Refills: 0 | Status: SHIPPED | OUTPATIENT
Start: 2019-02-04 | End: 2019-07-27 | Stop reason: HOSPADM

## 2019-02-04 RX ADMIN — CEPHALEXIN 500 MG: 500 CAPSULE ORAL at 05:28

## 2019-02-04 RX ADMIN — BACITRACIN ZINC, NEOMYCIN SULFATE, AND POLYMYXIN B SULFATE 1 SMALL APPLICATION: 400; 3.5; 5 OINTMENT TOPICAL at 08:52

## 2019-02-04 RX ADMIN — CEPHALEXIN 500 MG: 500 CAPSULE ORAL at 11:55

## 2019-02-04 RX ADMIN — OXYCODONE HYDROCHLORIDE 5 MG: 5 TABLET ORAL at 11:52

## 2019-02-04 RX ADMIN — ACETAMINOPHEN 650 MG: 325 TABLET, FILM COATED ORAL at 05:47

## 2019-02-04 RX ADMIN — CEPHALEXIN 500 MG: 500 CAPSULE ORAL at 00:06

## 2019-02-04 RX ADMIN — OXYCODONE HYDROCHLORIDE 5 MG: 5 TABLET ORAL at 05:44

## 2019-02-04 RX ADMIN — ACETAMINOPHEN 650 MG: 325 TABLET, FILM COATED ORAL at 11:52

## 2019-02-04 NOTE — UTILIZATION REVIEW
Continued Stay [facility]  Date: 02/04/2019  Vital Signs: /57 (BP Location: Left arm) Comment: Map 79  Pulse 70   Temp 98 3 °F (36 8 °C) (Oral)   Resp 18   Ht 5' 7" (1 702 m)   Wt 68 kg (150 lb)   SpO2 95%   BMI 23 49 kg/m²   Assessment/Plan:   Medications:   Scheduled Meds:   Current Facility-Administered Medications:  acetaminophen 650 mg Oral Q6H PRN Shima Craig, DO   cephalexin 500 mg Oral Q6H [facility] HOME Kelsea Bailey, DO   ibuprofen 600 mg Oral Q8H [facility] Kenyatta Boland MD   neomycin-bacitracin-polymyxin b 1 small application Topical BID Shima Craig, DO   oxyCODONE 5 mg Oral Q6H PRN Shima Craig, DO   Pertinent Labs/Diagnostic Results:   Age/Sex: 34 y o  male   Discharge Plan:   02/04/19 1320  Discharge patient Once     Discharge Disposition: Home/Self Care    Expected Discharge Time: Afternoon    Expected Discharge Date: 02/04/19

## 2019-02-04 NOTE — DISCHARGE INSTRUCTIONS
Change dressing on fingers of the right hand daily, then wrap with dry gauze  A prescription for the gauze used in the hospital has been sent to her pharmacy, however if they are too expensive you can use over-the-counter antibiotic ointment  You can use Tylenol or ibuprofen for pain relief  Frostbite   WHAT YOU NEED TO KNOW:   Frostbite is an injury that happens when the skin and tissue beneath the skin freeze  People usually get frostbite on the hands, feet, nose, and ears  DISCHARGE INSTRUCTIONS:   Medicines:   · Antibiotics:  Antibiotic medicine may be given to treat an infection caused by bacteria  · NSAIDs:  This medicine decreases swelling, pain, and fever  NSAIDs are available without a doctor's order  Ask which medicine is right for you and how much to take  Take as directed  NSAIDs can cause stomach bleeding or kidney problems if not taken correctly  · Pain medicine: You may be given a prescription medicine to decrease severe pain  Do not wait until the pain is severe before you take your pain medicine  · Take your medicine as directed  Contact your healthcare provider if you think your medicine is not helping or if you have side effects  Tell him of her if you are allergic to any medicine  Keep a list of the medicines, vitamins, and herbs you take  Include the amounts, and when and why you take them  Bring the list or the pill bottles to follow-up visits  Carry your medicine list with you in case of an emergency  Follow up with your healthcare provider as directed:  Write down your questions so you remember to ask them during your visits  Manage symptoms of frostbite:   · Elevate:  Raise the frostbitten area above the level of your heart as often as you can  This will help decrease swelling and pain  Prop the frostbitten area on pillows or blankets to keep it elevated  · Foot cradle: This will help keep bedding off your feet  Cut 2 sides off a large cardboard box   Put the box under your sheets at the foot of your bed  Put one of the open sections facing the head of the bed and the other open section against the mattress  · Activity:  Move the part of your body that has frostbite as much as possible  This will help keep blood flowing through the area and help it heal faster  Do not walk on frostbitten feet if possible  Prevent frostbite:   · Wear several layers of loose, warm clothes:  Put these layers under a windproof and waterproof coat  Your head, face, and neck should be covered with a warm hat and scarf  Breathing through your scarf helps prevent loss of body heat  Make sure your hands, ears, and feet are covered  · Use the bop.fm system when you are outside for long periods: This is when you and the people you are with check each other for white areas on your face and ears  · Do not drink alcohol or smoke before you go out in the cold:  Alcohol and tobacco increase your risk of frostbite  First aid for frostbite:   · Cover the frostbitten area with extra clothing or blankets  · Warm the frostbitten area against your body or someone else's body if possible  · Rewarm the frostbitten area of your body in warm water as soon as possible  Do not use hot water  · Drink warm liquids  · Do not put snow or direct heat on the frostbitten area  · Do not walk on frostbitten feet  · Do not rub the frostbitten area  Contact your healthcare provider if:   · You have blisters filled with blood  · Your pain gets worse  · You have questions or concerns about your condition or care  Return to the emergency department if:   · You have swelling, redness, or pus in the area that was frostbitten  · You have a fever  · The skin with frostbite turns black  · You lose feeling in the area that was frostbitten  © 2017 [address] [employer] Information is for End User's use only and may not be sold, redistributed or otherwise used for commercial purposes  All illustrations and images included in CareNotes® are the copyrighted property of [employer]  or Robert Neal  The above information is an  only  It is not intended as medical advice for individual conditions or treatments  Talk to your doctor, nurse or pharmacist before following any medical regimen to see if it is safe and effective for you  Polysubstance Abuse   WHAT YOU NEED TO KNOW:   Polysubstance abuse is the abuse of 2 or more drugs that cause impairment or distress  Examples include alcohol, nicotine, marijuana, cocaine, heroin, methamphetamine, hallucinogens such as mushrooms, or inhalants such as paint thinner  Prescribed medicines, such as opioids for pain or benzodiazepines for anxiety, are also commonly abused  DISCHARGE INSTRUCTIONS:   Call 911 for any of the following:   · You feel you might harm yourself or others  Return to the emergency department if:   · You have a seizure  · You have chest pain and your heart is beating faster than usual      · You have new shortness of breath  · You are dizzy and lightheaded  Contact your healthcare provider or therapist if:   · You are using drugs and think you are pregnant  · You have withdrawal symptoms and want to start using drugs again  · You have questions or concerns about your condition or care  Risks of polysubstance abuse:   · Drug dependence  is when you continue to use drugs, even when you know the risks  Polysubstance abuse can damage your heart, brain, lungs, liver, and gastrointestinal tract  You continue even when it causes problems with work, school, or relationships  You may have difficulty finding or keeping a job because of your drug dependence  · Drug tolerance  is when you need to use more drugs, or use them more often, to get the effects you want  You may not be able to stop using the drugs   When you try to stop, you may have withdrawal symptoms and strong cravings for the drugs     · Drug overdose  can occur when you take more drugs than your body can handle  This may be a small amount or a large amount  You can lose consciousness or have a seizure or stroke  Your heart can stop beating, or you can stop breathing  You may die from a drug overdose  Medicines:   · Withdrawal medicines  may be given according to the types of drugs you are abusing  Withdrawal from drugs can cause serious, life-threatening side effects  Certain medicines can help decrease your withdrawal symptoms and your desire for the drug  Ask for more information about the withdrawal medicines you may need  · Mood stabilizers  may be given to help prevent or treat depression or anxiety caused by drug abuse and withdrawal      · Take your medicine as directed  Contact your healthcare provider if you think your medicine is not helping or if you have side effects  Tell him or her if you are allergic to any medicine  Keep a list of the medicines, vitamins, and herbs you take  Include the amounts, and when and why you take them  Bring the list or the pill bottles to follow-up visits  Carry your medicine list with you in case of an emergency  Follow up with your healthcare provider as directed: You may be referred to a specialist to treat health conditions caused by your drug use  This includes mental health, heart, or lung specialists  Write down your questions so you remember to ask them during your visits  Therapy:  You may need therapy and support to stop using drugs:  · Cognitive and behavioral therapy  helps you change your thinking and behavior  It can help you develop plans to avoid the situations that make you want to use drugs  It also helps you cope with the feelings of wanting to use drugs  You may have individual or group therapy       · Contingency management  helps you set drug-free goals with a therapist  Dara Schreiber will decide ways to celebrate your success when you reach a goal      · Family therapy and support groups  allow you and your family members to talk to and be encouraged by other people affected by drug abuse  You and your family members may attend together or separately  Ask your healthcare provider for information about programs in your area  How polysubstance abuse affects unborn or  babies:   · If you are pregnant or get pregnant while using drugs, you may have a miscarriage or give birth early  Your baby may be born addicted to the drugs  · Do not breastfeed your baby if you use drugs  Drugs pass from your bloodstream into your breast milk and affect your baby's health  Talk with your healthcare provider if you are using drugs and breastfeeding  For support and more information:   · Alcoholics Anonymous  Web Address: http://GrabCAD/  · [employer] [place] on Drug and Alcohol Information  Phone: 5- 987 - 2412746  Web Address: [employer]  · [employer] on Alcoholism and Drug Dependence  Maura Graves[address] [address]  Phone: 5- 430 - 913-1783  Phone: 6- 585 - 781-5958  Web Address: [employer]  org  2017 [address] Information is for End User's use only and may not be sold, redistributed or otherwise used for commercial purposes  All illustrations and images included in CareNotes® are the copyrighted property of [employer]  or Robert Neal  The above information is an  only  It is not intended as medical advice for individual conditions or treatments  Talk to your doctor, nurse or pharmacist before following any medical regimen to see if it is safe and effective for you

## 2019-02-04 NOTE — PLAN OF CARE
CARDIOVASCULAR - ADULT     Maintains optimal cardiac output and hemodynamic stability Progressing     Absence of cardiac dysrhythmias or at baseline rhythm Progressing        DISCHARGE PLANNING     Discharge to home or other facility with appropriate resources Progressing        DISCHARGE PLANNING - CARE MANAGEMENT     Discharge to post-acute care or home with appropriate resources Progressing        INFECTION - ADULT     Absence or prevention of progression during hospitalization Progressing        Knowledge Deficit     Patient/family/caregiver demonstrates understanding of disease process, treatment plan, medications, and discharge instructions Progressing        PAIN - ADULT     Verbalizes/displays adequate comfort level or baseline comfort level Progressing        Potential for Falls     Patient will remain free of falls Progressing        SAFETY ADULT     Patient will remain free of falls Progressing        SKIN/TISSUE INTEGRITY - ADULT     Skin integrity remains intact Progressing     Incision(s), wounds(s) or drain site(s) healing without S/S of infection Progressing

## 2019-02-04 NOTE — DISCHARGE SUMMARY
[facility] Discharge Summary - Medical Mignon Parham 34 y o  male MRN: [id number]    [address] [facility] 4 MED SURG[facility]SD [facility] Encounter: [id number]    BRIEF OVERVIEW    Admitting Provider: Alia Mazariegos MD  Discharge Provider: Natalee Viera MD  Primary Care Physician at Discharge: None    Discharge To: Home    Admission Date: 1/31/2019     Discharge Date: 02/04/19    Primary Discharge Diagnosis  Principal Problem:    Amphetamine intoxication ([facility] [address] )  Active Problems:    Substance abuse ([facility] [address] )    Frostbite of right ear    Frostbite of hand, right  Resolved Problems:    Mild hypothermia     Tachycardia      Other Problems Addressed: None    Consulting Providers   Plastic Surgery - Dr Diamond Lin         Therapeutic Operative Procedures Performed  Blister decompression fingers R hand x2    Diagnostic Procedures Performed  labs: CBC, BMP, CK    Discharge Disposition: Home/Self Care  Discharged With Lines: no    Test Results Pending at Discharge: No    Outpatient Follow-Up  yes      Follow up: [facility]  Call to make an appointment to establish care  Follow up with consulting providers  yes      Physician name: Dr Diamond Lin  Specialty: Plastic Surgery  Follow up within next: Per instructions   Active Issues Requiring Follow-up   yes     Issue: Blister healing  What is Needed: Follow up with plastic/hand surgery  Follow-up Appointments Arranged: No       Code Status: Level 1 - Full Code  Advance Directive and Living Will: <no information>  Power of :    POLST:      Medications   See after visit summary for reconciled discharge medications provided to patient and family      Allergies  No Known Allergies  Discharge Diet: regular diet  Activity restrictions: as tolerated given injuries to R hand    [address] [facility]  Per HPI from Dr Emily Espino:    "Mignon Parham is a 34 y o  male with no significant past medical history other than substance abuse (admits to cocaine and methamphetamine) who was found by the police on the streets  Initially on presentation to the ED he said he was snorting cocaine but later said that he did not snort cocaine today but injected what he thought was methamphetamine into his right arm  He says he has injected it before so he thinks what he injected today was not the same because he became extremely confused after injecting, left his house and started wandering on the streets  Upon arrival patient was hypothermic to 93 9, tachycardic  He is currently complaining of pain, swelling and restriction of movements of the right hand  No nausea, vomiting, palpitations, chest pain, tremors, agitation, anxiety "    Workup showed potassium 5 4, a KI with creatinine 2 0, elevated calcium 10 4, CK MB 8 4, total CK 1656, CBC unremarkable  Patient remained on telemetry and fluids for several days, and tachycardia, hypothermia, and abnormal labs resolved without issue  Fingertips of the right hand became cyanotic-appearing and purple with loss of sensation at the fingertips, plastic surgery was consulted out of concern for compartment syndrome  Patient was found to have good pulses, and nitropaste was applied with return of color and circulation  Large tense blisters also developed on the 4th and 5th fingers of the right hand, as well as the posterior pinna of the right ear  The blister on the ear resolved spontaneously, and the large blisters of the 4th and 5th fingers were decompressed twice by Plastic surgery  Patient declined any inpatient or escalated addiction services  Physical Exam   Constitutional: He is oriented to person, place, and time  He appears well-developed and well-nourished  No distress  Fidgeting/anxious   HENT:   Head: Normocephalic and atraumatic     Posterior pinna of right ear with previously opened blister with scabbing, swelling much improved, no drainage or signs of infection   Eyes: Pupils are equal, round, and reactive to light  Conjunctivae and EOM are normal  No scleral icterus  Neck: Normal range of motion  Neck supple  No tracheal deviation present  Cardiovascular: Normal rate, regular rhythm and normal heart sounds  Exam reveals no friction rub  No murmur heard  Pulmonary/Chest: Effort normal and breath sounds normal  No respiratory distress  He has no wheezes  He has no rales  Abdominal: Soft  Bowel sounds are normal  He exhibits no distension  There is no tenderness  Musculoskeletal: He exhibits tenderness  He exhibits no edema or deformity  Right 4th and 5th fingers dressed in Xeroform gauze and wrapped, small blister of dorsal 3rd finger decreasing in size without drainage or signs of infection   Neurological: He is alert and oriented to person, place, and time  No cranial nerve deficit  Coordination normal    Pain and decreased sensation of right 4th and 5th fingers   Skin: Skin is warm and dry  No rash noted  He is not diaphoretic  No erythema  Psychiatric: His behavior is normal  Judgment and thought content normal  His mood appears anxious  Nursing note and vitals reviewed      Presenting Problem/History of Present Illness  Principal Problem:    Amphetamine intoxication ([facility] [address] )  Active Problems:    Substance abuse ([facility] [address] )    Frostbite of right ear    Frostbite of hand, right    LORETTA (acute kidney injury) ([facility] [address] )  Resolved Problems:    Mild hypothermia     Tachycardia    Amphetamine intoxication - resolved  - urinalysis positive for benzodiazepines and methamphetamine status post Valium in the emergency department  - telemetry [address]  - patient received several days of isolyte at 100 mL an hour  - patient declined referral for further treatment of drug addiction and substance abuse, states he would prefer to pursue treatment outpatient with his own resources      Tachycardia - resolved  - secondary to methamphetamine intoxication  - remained sinus rhythm  - resolved after several days, telemetry dc'd     Frostbite  - blisters developed on multiple fingers of the right hand and posterior pinna of the right ear  - patient received nitropaste  - plastic surgery consulted out of initial concern for compartment syndrome of right hand, however the symptoms improved  - large tense blisters of the right 4th and 5th fingers decompressed multiple times by Plastic surgery, smaller blisters resolved on their own  - pt received tdap  - patient will receive prescription for xeroform gauze to dress open wounds daily  - per surgery patient should dress wounds with Xeroform gauze wrapped in dry gauze, change daily and follow up with surgery outpatient per their instructions  - NSAIDs for pain relief    Other Pertinent Test Results  None     Discharge Condition: good      Discharge  Statement   I spent 30 minutes minutes discharging the patient  This time was spent on the day of discharge  I had direct contact with the patient on the day of discharge  Additional documentation is required if more than 30 minutes were spent on discharge

## 2019-02-04 NOTE — PLAN OF CARE
CARDIOVASCULAR - ADULT     Maintains optimal cardiac output and hemodynamic stability Adequate for Discharge     Absence of cardiac dysrhythmias or at baseline rhythm Adequate for Discharge        DISCHARGE PLANNING     Discharge to home or other facility with appropriate resources Adequate for Discharge        DISCHARGE PLANNING - CARE MANAGEMENT     Discharge to post-acute care or home with appropriate resources Adequate for Discharge        INFECTION - ADULT     Absence or prevention of progression during hospitalization Adequate for Discharge        Knowledge Deficit     Patient/family/caregiver demonstrates understanding of disease process, treatment plan, medications, and discharge instructions Adequate for Discharge        PAIN - ADULT     Verbalizes/displays adequate comfort level or baseline comfort level Adequate for Discharge        Potential for Falls     Patient will remain free of falls Adequate for Discharge        SAFETY ADULT     Patient will remain free of falls Adequate for Discharge        SKIN/TISSUE INTEGRITY - ADULT     Skin integrity remains intact Adequate for Discharge     Incision(s), wounds(s) or drain site(s) healing without S/S of infection Adequate for Discharge

## 2019-02-05 ENCOUNTER — HOSPITAL ENCOUNTER (EMERGENCY)
Facility: HOSPITAL | Age: 34
Discharge: HOME | End: 2019-02-05
Attending: EMERGENCY MEDICINE | Admitting: EMERGENCY MEDICINE

## 2019-02-05 VITALS
HEART RATE: 81 BPM | SYSTOLIC BLOOD PRESSURE: 126 MMHG | HEIGHT: 67 IN | OXYGEN SATURATION: 96 % | BODY MASS INDEX: 22.76 KG/M2 | TEMPERATURE: 98.7 F | DIASTOLIC BLOOD PRESSURE: 76 MMHG | RESPIRATION RATE: 18 BRPM | WEIGHT: 145 LBS

## 2019-02-05 DIAGNOSIS — G62.9 NEUROPATHY: ICD-10-CM

## 2019-02-05 DIAGNOSIS — T33.521S: Primary | ICD-10-CM
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
## 2019-02-05 PROCEDURE — 99283 EMERGENCY DEPT VISIT LOW MDM: CPT

## 2019-02-05 PROCEDURE — 96372 THER/PROPH/DIAG INJ SC/IM: CPT

## 2019-02-05 RX ORDER — GABAPENTIN 300 MG/1
300 CAPSULE ORAL 3 TIMES DAILY
Qty: 30 CAPSULE | Refills: 0 | Status: SHIPPED | OUTPATIENT
Start: 2019-02-05 | End: 2019-02-08

## 2019-02-05 RX ORDER — KETOROLAC TROMETHAMINE 30 MG/ML
60 INJECTION, SOLUTION INTRAMUSCULAR; INTRAVENOUS ONCE
Status: COMPLETED | OUTPATIENT
Start: 2019-02-05 | End: 2019-02-05

## 2019-02-05 RX ORDER — INDOMETHACIN 50 MG/1
50 CAPSULE ORAL 2 TIMES DAILY WITH MEALS
Qty: 20 CAPSULE | Refills: 0 | Status: ON HOLD | OUTPATIENT
Start: 2019-02-05 | End: 2019-07-27 | Stop reason: ALTCHOICE

## 2019-02-05 RX ADMIN — KETOROLAC TROMETHAMINE 60 MG: 30 INJECTION, SOLUTION INTRAMUSCULAR; INTRAVENOUS at 17:55

## 2019-02-05 NOTE — DISCHARGE INSTRUCTIONS
Peripheral Neuropathy   WHAT YOU NEED TO KNOW:   Peripheral neuropathy is a condition that affects how your nerves work  Nerves carry information from your brain to your body  The information does not transfer along your nerves correctly when you have neuropathy  When you have peripheral neuropathy, the nerves in your legs, arms, feet, or hands are affected  It also may affect your organs, such as your lungs, stomach, bladder, or genitals  This condition may go away on its own or you may always have it  DISCHARGE INSTRUCTIONS:   Medicines:   · Pain medicine: You may be given medicine to take away or decrease pain  Do not wait until the pain is severe before you take your medicine  · Antidepressants: This medicine helps to decrease or stop the symptoms of depression  It also used to help decrease pain  Take this medicine as directed  · Antiseizure medicine: This medicine is usually given to control seizures, but it also helps with nerve pain  · Take your medicine as directed  Contact your healthcare provider if you think your medicine is not helping or if you have side effects  Tell him of her if you are allergic to any medicine  Keep a list of the medicines, vitamins, and herbs you take  Include the amounts, and when and why you take them  Bring the list or the pill bottles to follow-up visits  Carry your medicine list with you in case of an emergency  Follow up with your healthcare provider or neurologist as directed:  Write down your questions so you remember to ask them during your visits  Physical therapy:  Physical and occupational therapists may help you exercise your arms, legs, and hands  They may teach you new ways to do things at home  Brace or splint:  You may need a device that supports or holds a body part still  For example, if you have carpal tunnel syndrome, you may need to wear a wrist brace  Manage your peripheral neuropathy:   · Avoid falls: Move with care and stand up slowly  Wear shoes that support your feet, and do not go barefoot  Ask about walking aids, such as a cane or walker  You may want to install railings or nonslip pads in your home, especially in the bathroom  Ask for more information on how to avoid falls  · Check your skin daily:  Sores can form where your skin makes contact with chairs, beds, or other body parts  They also can form under splints  Keep your skin clean, and check your skin daily for sores  · Exercise:  Ask about the best exercise plan for you  Physical activity may increase your balance and strength and may decrease your pain  It is best to start exercising slowly and do more as you get stronger  Contact your healthcare provider or neurologist if:   · Your pain is severe  · You cannot control your bladder  · You have trouble having sex  · You have questions or concerns about your condition or care  Return to the emergency department if:   · You fall  · You cannot walk at all  © 2017 [employer]  Information is for End User's use only and may not be sold, redistributed or otherwise used for commercial purposes  All illustrations and images included in CareNotes® are the copyrighted property of A D A M [employer]  or Robert Neal  The above information is an  only  It is not intended as medical advice for individual conditions or treatments  Talk to your doctor, nurse or pharmacist before following any medical regimen to see if it is safe and effective for you  Frostbite   WHAT YOU NEED TO KNOW:   What is frostbite? Frostbite is an injury that happens when the skin and tissue beneath the skin freezes  People usually get frostbite on the hands, feet, nose, and ears  What increases my risk for frostbite? You are at risk for frostbite if you are out in cold weather for a long time  Frostbite is more likely to happen to skin that is not covered   People who have poor circulation or cannot feel the effects of cold have a higher risk of frostbite  This includes the elderly, people with diabetes, or those who abuse alcohol or smoke  What are the signs and symptoms of frostbite? · Your skin first becomes cold and red  Then it gets numb and hard, and it turns white  Your skin color changes from white to red as it warms  You may feel pain, tingling, and burning as your skin warms  Your skin may swell and you may develop blisters  · With more severe frostbite, you may develop blisters filled with blood  The most severe type of frostbite causes gangrene  With gangrene, the skin turns black and the tissue dies  Healthcare providers may not know how much tissue damage has been caused by frostbite for several weeks  How is frostbite diagnosed? Your healthcare provider will examine the areas of your skin that have frostbite  He will also ask you about your signs and symptoms  · ECG:  This is also called an EKG  A short period of electrical activity in your heart is recorded to check for damage or problems  · Blood tests: You may need blood taken to give caregivers information about how your body is working  The blood may be taken from your hand, arm, or IV  · MRI:  This scan uses powerful magnets and a computer to take pictures of your blood vessels  An MRI may show if there is any damage to your blood vessels  You may be given dye to help the pictures show up better  Tell healthcare providers if you are allergic to iodine or shellfish  You may also be allergic to the dye  Do not enter the MRI room with any metal  Metal can cause serious injury  Tell healthcare providers if you have any metal in or on your body  · Bone scan: This is a test to look at your bones  You are given a small amount of dye in an IV  Pictures are taken of your bones to see if there is any damage from frostbite  How is frostbite treated?    · Warm bath:  A warm bath may help rewarm the areas of your body that have frostbite  · Bandages: The areas of your body with frostbite may need to be bandaged  Clean, sterile, bandages will help keep these areas from getting infected  Gauze pads may be put on and between injured fingers or toes  · Medicines:      ¨ Antibiotics:  Antibiotic medicine may be given to treat an infection caused by bacteria  ¨ NSAIDs:  This medicine decreases swelling, pain, and fever  NSAIDs are available without a doctor's order  Ask your healthcare provider which medicine is right for you and how much to take  Take as directed  NSAIDs can cause stomach bleeding or kidney problems if not taken correctly  ¨ Pain medicine: You may be given a prescription medicine to decrease severe pain  Do not wait until the pain is severe before you take your pain medicine  ¨ Td vaccine  is a booster shot used to help prevent tetanus and diphtheria  The Td booster may be given to adolescents and adults every 10 years or for certain wounds and injuries  What are the risks of frostbite? Even with treatment, you may have severe damage to blood vessels, muscles, and nerve tissue  You may lose a body part that has severe frostbite  You may be sensitive to cold and have burning and tingling in the areas of your body that have had frostbite  Without treatment, you could lose skin or a body part that has severe frostbite  How do I manage my symptoms? · Elevate:  Raise the frostbitten area above the level of your heart as often as you can  This will help decrease swelling and pain  Prop the frostbitten area on pillows or blankets to keep it elevated  · Foot cradle: This will help keep bedding off your feet if they have frostbite  Bedding could rub against the frostbitten area and cause pain  Cut 2 sides off a large cardboard box  Put the box under your sheets at the foot of your bed  Put one of the open sections facing the head of the bed and the other open section against the mattress       · Activity:  Move the part of your body that has frostbite as much as possible  This will help keep blood flowing through the area and help it heal faster  Do not walk on frostbitten feet if possible  How can I help prevent frostbite? · Wear several layers of loose, warm clothes:  Put these layers under a windproof and waterproof coat  Your head, face, and neck should be covered with a warm hat and scarf  Breathing through your scarf helps prevent loss of body heat  Make sure your hands, ears, and feet are covered  · Use the matt system when you are outside for long periods: This is when you and the people you are with check each other for white areas on your face and ears  · Do not drink alcohol or smoke before you go out in the cold:  Alcohol and tobacco increase your risk of frostbite  When should I contact my healthcare provider? · You have blisters filled with blood  · Your pain gets worse  · You have questions or concerns about your condition or care  When should I seek immediate care? · You have swelling, redness, or pus in the area that was frostbitten  · You have a fever  · The skin with frostbite turns black  · You lose feeling in the area that was frostbitten  CARE AGREEMENT:   You have the right to help plan your care  Learn about your health condition and how it may be treated  Discuss treatment options with your caregivers to decide what care you want to receive  You always have the right to refuse treatment  The above information is an  only  It is not intended as medical advice for individual conditions or treatments  Talk to your doctor, nurse or pharmacist before following any medical regimen to see if it is safe and effective for you  © 2017 [employer] Information is for End User's use only and may not be sold, redistributed or otherwise used for commercial purposes   All illustrations and images included in CareNotes® are the copyrighted property of Ernesto REINOSO  or Robert Neal

## 2019-02-05 NOTE — ED PROVIDER NOTES
History  Chief Complaint   Patient presents with    Hand Pain     Patient is a 68-year-old man who was recently admitted to [facility] on January 31st for methamphetamine and cocaine intoxication as well as frostbite to his left 4th and 5th fingers  During his hospitalization he was seen by Plastic surgery and was treated with antibiotics  Patient discharged the hospital yesterday with ibuprofen for analgesia and patient presents now because he says the pain is severe  Prior to Admission Medications   Prescriptions Last Dose Informant Patient Reported? Taking? Bismuth Tribromoph-Petrolatum (XEROFORM PETROLAT PATCH 4"X4") PADS   No No   Sig: Apply 1 each topically daily Cut pads into appropriate size for each finger and then wrap with gauze and tape    ketorolac (TORADOL) 10 mg tablet   No No   Sig: Take 1 tablet (10 mg total) by mouth every 6 (six) hours as needed for moderate pain   ketorolac (TORADOL) 10 mg tablet   No No   Sig: Take 1 tablet (10 mg total) by mouth every 6 (six) hours as needed for moderate pain      Facility-Administered Medications: None       History reviewed  No pertinent past medical history  History reviewed  No pertinent surgical history  History reviewed  No pertinent family history  I have reviewed and agree with the history as documented      Social History   Substance Use Topics    Smoking status: Light Tobacco Smoker     Packs/day: 0 25    Smokeless tobacco: Never Used    Alcohol use 4 2 oz/week     7 Standard drinks or equivalent per week        Review of Systems    Physical Exam  Physical Exam    Vital Signs  ED Triage Vitals [02/05/19 1711]   Temperature Pulse Respirations Blood Pressure SpO2   98 7 °F (37 1 °C) 81 18 126/76 96 %      Temp Source Heart Rate Source Patient Position - Orthostatic VS BP Location FiO2 (%)   Tympanic Monitor Lying Right arm --      Pain Score       Worst Possible Pain           Vitals:    02/05/19 1711   BP: 126/76 Pulse: 81   Patient Position - Orthostatic VS: Lying       Visual Acuity      ED Medications  Medications   ketorolac (TORADOL) injection 60 mg (60 mg Intramuscular Given 2/5/19 1175)       Diagnostic Studies  Results Reviewed     None                 No orders to display              Procedures  Procedures       Phone Contacts  ED Phone Contact    ED Course  ED Course as of Feb 05 2006 Tue Feb 05, 2019   1803 Discussed with Dr Naye Soto  Will treat with gabapentin and nonsteroidals clean dressings and patient will see him in the office this week                                MDM    Disposition  Final diagnoses:   Frostbite, hand, right, sequela   Neuropathy     Time reflects when diagnosis was documented in both MDM as applicable and the Disposition within this note     Time User Action Codes Description Comment    2/5/2019  6:04 PM Jarome Brought Add [L24 703O,  X31  XXXS] Frostbite, hand, right, sequela     2/5/2019  6:05 PM Jarome Brought Add [G62 9] Neuropathy       ED Disposition     ED Disposition Condition Date/Time Comment    Discharge  Tue Feb 5, 2019  6:04 PM Evelyn Lancaster discharge to home/self care  Condition at discharge: Good        Follow-up Information     Follow up With Specialties Details Why [address] MD Plastic Surgery, Wound Care Schedule an appointment as soon as possible for a visit in 2 days  [address]            Discharge Medication List as of 2/5/2019  6:08 PM      START taking these medications    Details   gabapentin (NEURONTIN) 300 mg capsule Take 1 capsule (300 mg total) by mouth 3 (three) times a day For post-herpetic neuralgia:  Take 1 tablet on day 1,  Then take 2 tablets on day 2, Then take 3 tablets on day 3 and every day after that as instructed by your doctor , Starting Tue 2/5/2019, P raymon      indomethacin (INDOCIN) 50 mg capsule Take 1 capsule (50 mg total) by mouth 2 (two) times a day with meals, Starting Tue 2/5/2019, Print         CONTINUE these medications which have NOT CHANGED    Details   Bismuth Tribromoph-Petrolatum (XEROFORM PETROLAT PATCH 4"X4") PADS Apply 1 each topically daily Cut pads into appropriate size for each finger and then wrap with gauze and tape , Starting Mon 2/4/2019, Normal      !! ketorolac (TORADOL) 10 mg tablet Take 1 tablet (10 mg total) by mouth every 6 (six) hours as needed for moderate pain, Starting Mon 2/4/2019, Normal      !! ketorolac (TORADOL) 10 mg tablet Take 1 tablet (10 mg total) by mouth every 6 (six) hours as needed for moderate pain, Starting Mon 2/4/2019, Normal       !! - Potential duplicate medications found  Please discuss with provider  No discharge procedures on file      ED Provider  Electronically Signed by           Tresa Matthew MD  02/05/19 2006

## 2019-02-08 ENCOUNTER — OFFICE VISIT (OUTPATIENT)
Dept: PLASTIC SURGERY | Facility: CLINIC | Age: 34
End: 2019-02-08

## 2019-02-08 VITALS — HEIGHT: 67 IN | WEIGHT: 150 LBS | BODY MASS INDEX: 23.54 KG/M2
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
## 2019-02-08 DIAGNOSIS — T33.011D: ICD-10-CM

## 2019-02-08 DIAGNOSIS — G62.9 NEUROPATHY: ICD-10-CM

## 2019-02-08 DIAGNOSIS — T33.521D FROSTBITE OF RIGHT HAND, SUBSEQUENT ENCOUNTER: Primary | ICD-10-CM

## 2019-02-08 PROCEDURE — 99024 POSTOP FOLLOW-UP VISIT: CPT | Performed by: PHYSICIAN ASSISTANT

## 2019-02-08 RX ORDER — TRAMADOL HYDROCHLORIDE 50 MG/1
50 TABLET ORAL EVERY 8 HOURS PRN
Qty: 27 TABLET | Refills: 0 | Status: SHIPPED | OUTPATIENT
Start: 2019-02-08 | End: 2019-07-27 | Stop reason: HOSPADM

## 2019-02-08 RX ORDER — GABAPENTIN 300 MG/1
300 CAPSULE ORAL 3 TIMES DAILY
Qty: 30 CAPSULE | Refills: 0 | Status: SHIPPED | OUTPATIENT
Start: 2019-02-08 | End: 2019-02-21 | Stop reason: SDUPTHER

## 2019-02-08 RX ORDER — CELECOXIB 100 MG/1
100 CAPSULE ORAL 2 TIMES DAILY
Qty: 28 CAPSULE | Refills: 0 | Status: ON HOLD | OUTPATIENT
Start: 2019-02-08 | End: 2019-07-27 | Stop reason: ALTCHOICE

## 2019-02-08 NOTE — LETTER
February 8, 2019     Patient: Bobby Fernandez   YOB: 1985   Date of Visit: 2/8/2019       To Whom it May Concern:    Bobby Fernandez is under my professional care  He was seen in my office on 2/8/2019  He may not return to work until cleared by physician due to medical condition  If you have any questions or concerns, please don't hesitate to call           Sincerely,          Inocencio Russo PA-C        CC: Bobby Caenstein

## 2019-02-14 NOTE — PROGRESS NOTES
Patient is a 51-year-old man who was recently admitted to [facility] on January 31st for methamphetamine and cocaine intoxication as well as frostbite to his left 4th and 5th fingers      Pt has sloughing of skin, more of which will occur  Advised that meth and cocaine will inhibit his ability to heal     Dr Lola Falcon advised to soak hand, and apply aquaphor twice daily  He will followupd in 2-4 weeks

## 2019-02-18 ENCOUNTER — EVALUATION (OUTPATIENT)
Dept: OCCUPATIONAL THERAPY | Facility: CLINIC | Age: 34
End: 2019-02-18
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
## 2019-02-18 DIAGNOSIS — T33.521D FROSTBITE OF RIGHT HAND, SUBSEQUENT ENCOUNTER: Primary | ICD-10-CM

## 2019-02-18 DIAGNOSIS — G62.9 NEUROPATHY: ICD-10-CM

## 2019-02-18 DIAGNOSIS — T33.011D: ICD-10-CM

## 2019-02-18 PROCEDURE — 97110 THERAPEUTIC EXERCISES: CPT

## 2019-02-18 PROCEDURE — 97165 OT EVAL LOW COMPLEX 30 MIN: CPT

## 2019-02-18 NOTE — PROGRESS NOTES
OT Evaluation     Today's date: 2019  Patient name: Michael Hutchison  : 1985  MRN: [id number]  Referring provider: Chuck Camp PA-C  Dx:   Encounter Diagnosis     ICD-10-CM    1  Frostbite of right hand, subsequent encounter T33 521D     X31  XXXD                   Assessment  Assessment details: This is a 34year old, right hand dominant male who suffered frostbite to the right digits at the end 2018  Patient now presents with impaired ROM and strength in the digits  Sensation is impaired largely due to dried, hardened skin on the digits  Sensation is mildly impaired  This patient is a good candidate for OT services to restore right hand ROM, strength, sensation, and coordination for a return to work and independence in ADLs and IADLs  Impairments: abnormal or restricted ROM, impaired physical strength, lacks appropriate home exercise program and pain with function  Other impairment: edema  Functional limitations: Impaired ADLs/IADLsBarriers to therapy: Hx substance abuse  Understanding of Dx/Px/POC: excellent   Prognosis: good    Goals  STGs ( 4 weeks)  1  Patient will be independent in HEP for right hand ROM and strength  2  Patient will demonstrate BACA in the thumb to 120   3   Patient will demonstrate increase of 20-30 degrees in BACA of digits 2-5  4  Patient will report 2 level decrease in pain level    LTGs ( 8 weeks)  1  Patient will be independent in HEP to maintain ROM, strength, and coordination at discharge  2  Patient will report a maximum pain level of 2/10 in the right hand during activities  3  Patient will demonstrate BACA in right digits 2-5 to 230 degrees to prepare to return to work  4    Patient will demonstrate right hand  and pinch strength within 30% of the left hand to prepare to return to work    Plan  Patient would benefit from: skilled occupational therapy  Planned therapy interventions: activity modification, compression, fine motor coordination training, graded exercise, graded activity, home exercise program, therapeutic exercise, therapeutic activities, stretching, strengthening, patient education, orthotic fitting/training, neuromuscular re-education, manual therapy and joint mobilization  Frequency: 1-3x week  Duration in weeks: 8  Plan of Care beginning date: 2019  Plan of Care expiration date: 2019  Treatment plan discussed with: patient and family        Subjective Evaluation    History of Present Illness  Date of onset: 2019  Mechanism of injury: Shanna Arias is a 34 y o  male with no significant past medical history other than substance abuse (admits to cocaine and methamphetamine) who was found by the police on the streets on 19  Initially on presentation to the ED he said he was snorting cocaine but later said that he did not snort cocaine today but injected what he thought was methamphetamine into his right arm  He says he has injected it before so he thinks what he injected today was not the same because he became extremely confused after injecting, left his house and started wandering on the streets  He became hypothermic and suffered frostbite to the right digits and right ear  He was hospitalized for approximately 4 days for wound care to the right digits and ear  Patient now presents for OT evaluation and treatment          Not a recurrent problem   Quality of life: good    Pain  Current pain ratin  At best pain ratin  At worst pain ratin  Location: Joints of right small and ring fingers  Quality: burning, sharp and squeezing  Relieving factors: medications (Cracking the knuckles)  Exacerbated by: cold weather, dependent positioning  Progression: improved    Social Support  Lives with: spouse and young children    Employment status: not working (employed at [employer] in [place]    Unable to work due to injury)  Hand dominance: right    Treatments  Previous treatment: medication (Dressing changes in hospital)  Current treatment: occupational therapy  Patient Goals  Patient goals for therapy: decreased edema, increased strength, decreased pain, increased motion, independence with ADLs/IADLs and return to work  Patient goal: Be able to have a tight         Objective     Observations     Right Wrist/Hand   Positive for effusion  Additional Observation Details  2/18/19:  Dried, hardened skin on the right digits  No open wounds  Joint contractures IP joints right digits  9 hole peg test:  R = 22 sec  L = 22 sec    Neurological Testing     Additional Neurological Details  2/18/19:  Diminished sensation in the right hand due to hardened, dried skin  Patient has good sensation in areas the skin has been debrided  Active Range of Motion     Right Wrist   Normal active range of motion    Right Thumb   Flexion     CMC: 5    MP: 45    DIP: 60  Extension     CMC: 45    MP: -20    DIP: 0  Opposition: 2/18/19:  BACA thumb = 90    Opposition to small finger    Right Digits   Flexion   Index     MCP: 80    PIP: 100    DIP: 35  Middle     MCP: 85    PIP: 105    DIP: 40  Ring     MCP: 85    PIP: 95    DIP: 20  Little     MCP: 80    PIP: 45    DIP: 15  Extension   Ring     PIP: -15  Little     PIP: -30    Additional Active Range of Motion Details  2/18/19:  BACA index = 215, long = 230, ring = 185, small = 110    Strength/Myotome Testing     Left Wrist/Hand      (2nd hand position)     Trial 1: 128    Thumb Strength  Key/Lateral Pinch     Trail 1: 28  Tip/Two-Point Pinch     Trial 1: 17  Palmar/Three-Point Pinch     Trial 1: 18    Right Wrist/Hand   Wrist extension: 5  Wrist flexion: 5  Radial deviation: 5  Ulnar deviation: 5     (2nd hand position)     Trial 1: 80    Thumb Strength   Key/Lateral Pinch     Trial 1: 20  Tip/Two-Point Pinch     Trial 1: 8  Palmar/Three-Point Pinch     Trial 1: 13    Swelling     Left Wrist/Hand   Circumference MCP: 21 5 cm    Right Wrist/Hand   Circumference MCP: 22 cm          Precautions:  Neuropathy, hx substance abuse    Specialty Daily Treatment Diary     Manual  2/18       Debridement  10'       PROM digits                                    Exercise Diary  2/18       HEP TGE, active blocking, SROM       Active blocking        TGE                                                                                                                                                    Modalities
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
## 2019-02-21 DIAGNOSIS — T33.521D FROSTBITE OF RIGHT HAND, SUBSEQUENT ENCOUNTER: ICD-10-CM

## 2019-02-21 RX ORDER — GABAPENTIN 300 MG/1
300 CAPSULE ORAL 3 TIMES DAILY
Qty: 30 CAPSULE | Refills: 0 | Status: SHIPPED | OUTPATIENT
Start: 2019-02-21 | End: 2019-03-01 | Stop reason: SDUPTHER

## 2019-02-22 ENCOUNTER — TELEPHONE (OUTPATIENT)
Dept: PAIN MEDICINE | Facility: CLINIC | Age: 34
End: 2019-02-22

## 2019-02-25 ENCOUNTER — OFFICE VISIT (OUTPATIENT)
Dept: OCCUPATIONAL THERAPY | Facility: CLINIC | Age: 34
End: 2019-02-25

## 2019-02-25 DIAGNOSIS — T33.521D FROSTBITE OF RIGHT HAND, SUBSEQUENT ENCOUNTER: Primary | ICD-10-CM
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
## 2019-02-25 PROCEDURE — 97140 MANUAL THERAPY 1/> REGIONS: CPT | Performed by: OCCUPATIONAL THERAPIST

## 2019-02-25 NOTE — PROGRESS NOTES
Daily Note     Today's date: 2019  Patient name: Adonis Ramirez  : 1985  MRN: [id number]  Referring provider: Juli Craig PA-C  Dx:   Encounter Diagnosis     ICD-10-CM    1  Frostbite of right hand, subsequent encounter T33 521D     X31  XXXD                   Subjective: "it is a 10/10 pain" during place and hold exercises    Objective: See treatment diary below  Performed sensory testing:  Thumb, index, long=2 83  Ring and small=4 56 but signs of improved sensation on the P1 and moving distally  SF AROM:  MP=82  PIP=69  DIP=22    Assessment: Mobilizations and stretching dictated by pain, which was most prevalent in his SF, along the dorsal MP  The skin is debrided completely from last visit to today  Educated on intrinsic pen rolls and also progressive grasp pencils for HEP  Plan: Continue per plan of care       Precautions:  Neuropathy, hx substance abuse     Specialty Daily Treatment Diary      Manual           Debridement  10'  D/C         PROM digits    5'         STM    5'         Joint mobs SF    3'                             Exercise Diary           HEP TGE, active blocking, SROM           Active blocking   10x PIP and DIP          TGE             Place and holds for composite digit flexion    5x, 10 second holds          Pegs with intrinsic fist   Peg board in and out         Marbles adduction between SF and RF   20x                                                                                                                                                                                                                   Modalities
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
## 2019-03-01 DIAGNOSIS — T33.521D FROSTBITE OF RIGHT HAND, SUBSEQUENT ENCOUNTER: ICD-10-CM

## 2019-03-01 RX ORDER — GABAPENTIN 300 MG/1
300 CAPSULE ORAL 3 TIMES DAILY
Qty: 30 CAPSULE | Refills: 0 | Status: SHIPPED | OUTPATIENT
Start: 2019-03-01 | End: 2019-03-08 | Stop reason: SDUPTHER

## 2019-03-01 NOTE — TELEPHONE ENCOUNTER
Charlie Harvey called and requested a refill on the gabapentin along with a return to work note for 3/4/2019  FAX# [phone]    Faxed work note to above fax #  [phone] [id number]-[id number]
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
## 2019-03-08 DIAGNOSIS — T33.521D FROSTBITE OF RIGHT HAND, SUBSEQUENT ENCOUNTER: ICD-10-CM

## 2019-03-08 RX ORDER — GABAPENTIN 300 MG/1
300 CAPSULE ORAL 3 TIMES DAILY
Qty: 30 CAPSULE | Refills: 0 | Status: SHIPPED | OUTPATIENT
Start: 2019-03-08 | End: 2019-07-27 | Stop reason: HOSPADM

## 2019-03-08 RX ORDER — GABAPENTIN 300 MG/1
300 CAPSULE ORAL 3 TIMES DAILY
Qty: 90 CAPSULE | Refills: 0 | Status: SHIPPED | OUTPATIENT
Start: 2019-03-08 | End: 2019-03-08

## 2019-04-07 ENCOUNTER — TELEPHONE (OUTPATIENT)
Dept: OCCUPATIONAL THERAPY | Facility: CLINIC | Age: 34
End: 2019-04-07

## 2019-07-26 ENCOUNTER — HOSPITAL ENCOUNTER (OUTPATIENT)
Facility: HOSPITAL | Age: 34
Setting detail: OBSERVATION
Discharge: HOME | End: 2019-07-27
Attending: EMERGENCY MEDICINE | Admitting: FAMILY MEDICINE

## 2019-07-26 DIAGNOSIS — M62.82 RHABDOMYOLYSIS: ICD-10-CM

## 2019-07-26 DIAGNOSIS — F19.929 INTOXICATION BY DRUG (HCC): ICD-10-CM

## 2019-07-26 DIAGNOSIS — R41.82 ALTERED MENTAL STATUS: Primary | ICD-10-CM

## 2019-07-26 DIAGNOSIS — N17.9 AKI (ACUTE KIDNEY INJURY) (HCC): ICD-10-CM

## 2019-07-26 LAB
ALBUMIN SERPL BCP-MCNC: 5.3 G/DL (ref 3.5–5)
ALP SERPL-CCNC: 94 U/L (ref 46–116)
ALT SERPL W P-5'-P-CCNC: 37 U/L (ref 12–78)
ANION GAP SERPL CALCULATED.3IONS-SCNC: 22 MMOL/L (ref 4–13)
APAP SERPL-MCNC: <2 UG/ML (ref 10–20)
AST SERPL W P-5'-P-CCNC: 72 U/L (ref 5–45)
BASOPHILS # BLD AUTO: 0.06 THOUSANDS/ΜL (ref 0–0.1)
BASOPHILS NFR BLD AUTO: 0 % (ref 0–1)
BILIRUB SERPL-MCNC: 1.4 MG/DL (ref 0.2–1)
BUN SERPL-MCNC: 23 MG/DL (ref 5–25)
CALCIUM SERPL-MCNC: 10.5 MG/DL (ref 8.3–10.1)
CHLORIDE SERPL-SCNC: 100 MMOL/L (ref 100–108)
CK MB SERPL-MCNC: 6.7 NG/ML (ref 0–5)
CK MB SERPL-MCNC: <1 % (ref 0–2.5)
CK SERPL-CCNC: 5336 U/L (ref 39–308)
CO2 SERPL-SCNC: 20 MMOL/L (ref 21–32)
CREAT SERPL-MCNC: 2.43 MG/DL (ref 0.6–1.3)
EOSINOPHIL # BLD AUTO: 0.01 THOUSAND/ΜL (ref 0–0.61)
EOSINOPHIL NFR BLD AUTO: 0 % (ref 0–6)
ERYTHROCYTE [DISTWIDTH] IN BLOOD BY AUTOMATED COUNT: 13.2 % (ref 11.6–15.1)
ETHANOL SERPL-MCNC: <3 MG/DL (ref 0–3)
GFR SERPL CREATININE-BSD FRML MDRD: 39 ML/MIN/1.73SQ M
GLUCOSE SERPL-MCNC: 144 MG/DL (ref 65–140)
HCT VFR BLD AUTO: 48.9 % (ref 36.5–49.3)
HGB BLD-MCNC: 15.1 G/DL (ref 12–17)
IMM GRANULOCYTES # BLD AUTO: 0.09 THOUSAND/UL (ref 0–0.2)
IMM GRANULOCYTES NFR BLD AUTO: 1 % (ref 0–2)
LYMPHOCYTES # BLD AUTO: 1.56 THOUSANDS/ΜL (ref 0.6–4.47)
LYMPHOCYTES NFR BLD AUTO: 8 % (ref 14–44)
MCH RBC QN AUTO: 24.6 PG (ref 26.8–34.3)
MCHC RBC AUTO-ENTMCNC: 30.9 G/DL (ref 31.4–37.4)
MCV RBC AUTO: 80 FL (ref 82–98)
MONOCYTES # BLD AUTO: 0.9 THOUSAND/ΜL (ref 0.17–1.22)
MONOCYTES NFR BLD AUTO: 5 % (ref 4–12)
NEUTROPHILS # BLD AUTO: 16.02 THOUSANDS/ΜL (ref 1.85–7.62)
NEUTS SEG NFR BLD AUTO: 86 % (ref 43–75)
NRBC BLD AUTO-RTO: 0 /100 WBCS
PLATELET # BLD AUTO: 337 THOUSANDS/UL (ref 149–390)
PMV BLD AUTO: 10.7 FL (ref 8.9–12.7)
POTASSIUM SERPL-SCNC: 3.3 MMOL/L (ref 3.5–5.3)
PROT SERPL-MCNC: 9.5 G/DL (ref 6.4–8.2)
RBC # BLD AUTO: 6.15 MILLION/UL (ref 3.88–5.62)
SALICYLATES SERPL-MCNC: <3 MG/DL (ref 3–20)
SODIUM SERPL-SCNC: 142 MMOL/L (ref 136–145)
TSH SERPL DL<=0.05 MIU/L-ACNC: 4.33 UIU/ML (ref 0.36–3.74)
WBC # BLD AUTO: 18.64 THOUSAND/UL (ref 4.31–10.16)

## 2019-07-26 PROCEDURE — 80320 DRUG SCREEN QUANTALCOHOLS: CPT | Performed by: EMERGENCY MEDICINE

## 2019-07-26 PROCEDURE — 85025 COMPLETE CBC W/AUTO DIFF WBC: CPT | Performed by: EMERGENCY MEDICINE

## 2019-07-26 PROCEDURE — 96361 HYDRATE IV INFUSION ADD-ON: CPT

## 2019-07-26 PROCEDURE — 80329 ANALGESICS NON-OPIOID 1 OR 2: CPT | Performed by: EMERGENCY MEDICINE

## 2019-07-26 PROCEDURE — 82553 CREATINE MB FRACTION: CPT | Performed by: EMERGENCY MEDICINE

## 2019-07-26 PROCEDURE — 99449 NTRPROF PH1/NTRNET/EHR 31/>: CPT | Performed by: EMERGENCY MEDICINE

## 2019-07-26 PROCEDURE — 36415 COLL VENOUS BLD VENIPUNCTURE: CPT | Performed by: EMERGENCY MEDICINE

## 2019-07-26 PROCEDURE — 93005 ELECTROCARDIOGRAM TRACING: CPT

## 2019-07-26 PROCEDURE — 82550 ASSAY OF CK (CPK): CPT | Performed by: EMERGENCY MEDICINE

## 2019-07-26 PROCEDURE — 96374 THER/PROPH/DIAG INJ IV PUSH: CPT

## 2019-07-26 PROCEDURE — NC001 PR NO CHARGE: Performed by: EMERGENCY MEDICINE

## 2019-07-26 PROCEDURE — 99285 EMERGENCY DEPT VISIT HI MDM: CPT

## 2019-07-26 PROCEDURE — 80053 COMPREHEN METABOLIC PANEL: CPT | Performed by: EMERGENCY MEDICINE

## 2019-07-26 PROCEDURE — 84443 ASSAY THYROID STIM HORMONE: CPT | Performed by: EMERGENCY MEDICINE

## 2019-07-26 RX ORDER — DIAZEPAM 5 MG/ML
5 INJECTION, SOLUTION INTRAMUSCULAR; INTRAVENOUS ONCE
Status: DISCONTINUED | OUTPATIENT
Start: 2019-07-26 | End: 2019-07-26

## 2019-07-26 RX ORDER — DIAZEPAM 5 MG/ML
10 INJECTION, SOLUTION INTRAMUSCULAR; INTRAVENOUS ONCE
Status: DISCONTINUED | OUTPATIENT
Start: 2019-07-26 | End: 2019-07-26

## 2019-07-26 RX ORDER — DIAZEPAM 5 MG/ML
10 INJECTION, SOLUTION INTRAMUSCULAR; INTRAVENOUS ONCE
Status: COMPLETED | OUTPATIENT
Start: 2019-07-26 | End: 2019-07-26

## 2019-07-26 RX ORDER — GABAPENTIN 100 MG/1
100 CAPSULE ORAL 3 TIMES DAILY
COMMUNITY
End: 2019-07-27 | Stop reason: HOSPADM

## 2019-07-26 RX ADMIN — DIAZEPAM 10 MG: 5 INJECTION, SOLUTION INTRAMUSCULAR; INTRAVENOUS at 22:07

## 2019-07-26 RX ADMIN — DIAZEPAM 10 MG: 5 INJECTION, SOLUTION INTRAMUSCULAR; INTRAVENOUS at 22:31

## 2019-07-26 RX ADMIN — SODIUM CHLORIDE 1000 ML: 0.9 INJECTION, SOLUTION INTRAVENOUS at 22:30

## 2019-07-26 RX ADMIN — SODIUM CHLORIDE 1000 ML: 0.9 INJECTION, SOLUTION INTRAVENOUS at 23:39

## 2019-07-26 NOTE — LETTER
[facility]  [address] [place] [id number]  Dept: [phone]    July 27, 2019     Patient: Zonia Cochran   YOB: 1985   Date of Visit: 7/26/2019       To Whom it May Concern:    Zonia Cochran is under my professional care  He was seen in the hospital from 9:51 PM 7/26/2019   to 07/27/19  He may return to work on 07/28/2019 without limitations  If you have any questions or concerns, please don't hesitate to call           Sincerely,          Marcelino Chavez MD

## 2019-07-27 VITALS
SYSTOLIC BLOOD PRESSURE: 114 MMHG | RESPIRATION RATE: 18 BRPM | WEIGHT: 152.12 LBS | BODY MASS INDEX: 23.88 KG/M2 | TEMPERATURE: 98.6 F | OXYGEN SATURATION: 100 % | HEART RATE: 70 BPM | DIASTOLIC BLOOD PRESSURE: 62 MMHG | HEIGHT: 67 IN

## 2019-07-27 PROBLEM — F19.10 SUBSTANCE ABUSE (HCC): Status: RESOLVED | Noted: 2019-01-31 | Resolved: 2019-07-27

## 2019-07-27 PROBLEM — F15.929 AMPHETAMINE INTOXICATION (HCC): Status: RESOLVED | Noted: 2019-01-31 | Resolved: 2019-07-27

## 2019-07-27 PROBLEM — M62.82 RHABDOMYOLYSIS: Status: ACTIVE | Noted: 2019-07-27

## 2019-07-27 LAB
ALBUMIN SERPL BCP-MCNC: 3.6 G/DL (ref 3.5–5)
ALP SERPL-CCNC: 70 U/L (ref 46–116)
ALT SERPL W P-5'-P-CCNC: 25 U/L (ref 12–78)
ANION GAP SERPL CALCULATED.3IONS-SCNC: 11 MMOL/L (ref 4–13)
ANION GAP SERPL CALCULATED.3IONS-SCNC: 8 MMOL/L (ref 4–13)
AST SERPL W P-5'-P-CCNC: 53 U/L (ref 5–45)
BILIRUB SERPL-MCNC: 0.6 MG/DL (ref 0.2–1)
BUN SERPL-MCNC: 18 MG/DL (ref 5–25)
BUN SERPL-MCNC: 21 MG/DL (ref 5–25)
CALCIUM SERPL-MCNC: 8.7 MG/DL (ref 8.3–10.1)
CALCIUM SERPL-MCNC: 8.9 MG/DL (ref 8.3–10.1)
CHLORIDE SERPL-SCNC: 107 MMOL/L (ref 100–108)
CHLORIDE SERPL-SCNC: 107 MMOL/L (ref 100–108)
CK MB SERPL-MCNC: 5.6 NG/ML (ref 0–5)
CK MB SERPL-MCNC: 5.9 NG/ML (ref 0–5)
CK MB SERPL-MCNC: <1 % (ref 0–2.5)
CK MB SERPL-MCNC: <1 % (ref 0–2.5)
CK SERPL-CCNC: 2839 U/L (ref 39–308)
CK SERPL-CCNC: 3479 U/L (ref 39–308)
CO2 SERPL-SCNC: 23 MMOL/L (ref 21–32)
CO2 SERPL-SCNC: 26 MMOL/L (ref 21–32)
CREAT SERPL-MCNC: 1.33 MG/DL (ref 0.6–1.3)
CREAT SERPL-MCNC: 1.4 MG/DL (ref 0.6–1.3)
ERYTHROCYTE [DISTWIDTH] IN BLOOD BY AUTOMATED COUNT: 13.1 % (ref 11.6–15.1)
GFR SERPL CREATININE-BSD FRML MDRD: 77 ML/MIN/1.73SQ M
GFR SERPL CREATININE-BSD FRML MDRD: 82 ML/MIN/1.73SQ M
GLUCOSE P FAST SERPL-MCNC: 94 MG/DL (ref 65–99)
GLUCOSE SERPL-MCNC: 92 MG/DL (ref 65–140)
GLUCOSE SERPL-MCNC: 94 MG/DL (ref 65–140)
HAV IGM SER QL: NORMAL
HBV CORE IGM SER QL: NORMAL
HBV SURFACE AG SER QL: NORMAL
HCT VFR BLD AUTO: 40.9 % (ref 36.5–49.3)
HCV AB SER QL: NORMAL
HGB BLD-MCNC: 12.9 G/DL (ref 12–17)
HIV 1+2 AB+HIV1 P24 AG SERPL QL IA: NORMAL
HIV1 P24 AG SER QL: NORMAL
MCH RBC QN AUTO: 24.9 PG (ref 26.8–34.3)
MCHC RBC AUTO-ENTMCNC: 31.5 G/DL (ref 31.4–37.4)
MCV RBC AUTO: 79 FL (ref 82–98)
PLATELET # BLD AUTO: 262 THOUSANDS/UL (ref 149–390)
PMV BLD AUTO: 10.6 FL (ref 8.9–12.7)
POTASSIUM SERPL-SCNC: 3.8 MMOL/L (ref 3.5–5.3)
POTASSIUM SERPL-SCNC: 4 MMOL/L (ref 3.5–5.3)
PROT SERPL-MCNC: 6.8 G/DL (ref 6.4–8.2)
RBC # BLD AUTO: 5.18 MILLION/UL (ref 3.88–5.62)
SODIUM SERPL-SCNC: 141 MMOL/L (ref 136–145)
SODIUM SERPL-SCNC: 141 MMOL/L (ref 136–145)
WBC # BLD AUTO: 9.9 THOUSAND/UL (ref 4.31–10.16)

## 2019-07-27 PROCEDURE — 82553 CREATINE MB FRACTION: CPT | Performed by: PHYSICIAN ASSISTANT

## 2019-07-27 PROCEDURE — 80053 COMPREHEN METABOLIC PANEL: CPT | Performed by: FAMILY MEDICINE

## 2019-07-27 PROCEDURE — 99285 EMERGENCY DEPT VISIT HI MDM: CPT | Performed by: EMERGENCY MEDICINE

## 2019-07-27 PROCEDURE — 87806 HIV AG W/HIV1&2 ANTB W/OPTIC: CPT | Performed by: PHYSICIAN ASSISTANT

## 2019-07-27 PROCEDURE — 99235 HOSP IP/OBS SAME DATE MOD 70: CPT | Performed by: FAMILY MEDICINE

## 2019-07-27 PROCEDURE — 80048 BASIC METABOLIC PNL TOTAL CA: CPT | Performed by: PHYSICIAN ASSISTANT

## 2019-07-27 PROCEDURE — 82550 ASSAY OF CK (CPK): CPT | Performed by: PHYSICIAN ASSISTANT

## 2019-07-27 PROCEDURE — 82550 ASSAY OF CK (CPK): CPT | Performed by: FAMILY MEDICINE

## 2019-07-27 PROCEDURE — 80074 ACUTE HEPATITIS PANEL: CPT | Performed by: PHYSICIAN ASSISTANT

## 2019-07-27 PROCEDURE — 85027 COMPLETE CBC AUTOMATED: CPT | Performed by: PHYSICIAN ASSISTANT

## 2019-07-27 PROCEDURE — 82553 CREATINE MB FRACTION: CPT | Performed by: FAMILY MEDICINE

## 2019-07-27 PROCEDURE — 36415 COLL VENOUS BLD VENIPUNCTURE: CPT | Performed by: PHYSICIAN ASSISTANT

## 2019-07-27 RX ORDER — ACETAMINOPHEN 325 MG/1
650 TABLET ORAL EVERY 6 HOURS PRN
Status: DISCONTINUED | OUTPATIENT
Start: 2019-07-27 | End: 2019-07-27 | Stop reason: HOSPADM

## 2019-07-27 RX ORDER — CALCIUM CARBONATE 200(500)MG
1000 TABLET,CHEWABLE ORAL DAILY PRN
Status: DISCONTINUED | OUTPATIENT
Start: 2019-07-27 | End: 2019-07-27 | Stop reason: HOSPADM

## 2019-07-27 RX ORDER — POTASSIUM CHLORIDE 20 MEQ/1
40 TABLET, EXTENDED RELEASE ORAL ONCE
Status: COMPLETED | OUTPATIENT
Start: 2019-07-27 | End: 2019-07-27

## 2019-07-27 RX ORDER — SODIUM CHLORIDE 9 MG/ML
125 INJECTION, SOLUTION INTRAVENOUS CONTINUOUS
Status: DISCONTINUED | OUTPATIENT
Start: 2019-07-27 | End: 2019-07-27 | Stop reason: HOSPADM

## 2019-07-27 RX ORDER — DIAZEPAM 5 MG/ML
5 INJECTION, SOLUTION INTRAMUSCULAR; INTRAVENOUS ONCE
Status: COMPLETED | OUTPATIENT
Start: 2019-07-27 | End: 2019-07-27

## 2019-07-27 RX ORDER — ONDANSETRON 2 MG/ML
4 INJECTION INTRAMUSCULAR; INTRAVENOUS EVERY 6 HOURS PRN
Status: DISCONTINUED | OUTPATIENT
Start: 2019-07-27 | End: 2019-07-27 | Stop reason: HOSPADM

## 2019-07-27 RX ORDER — NICOTINE 21 MG/24HR
1 PATCH, TRANSDERMAL 24 HOURS TRANSDERMAL DAILY
Status: DISCONTINUED | OUTPATIENT
Start: 2019-07-27 | End: 2019-07-27 | Stop reason: HOSPADM

## 2019-07-27 RX ADMIN — SODIUM CHLORIDE 125 ML/HR: 0.9 INJECTION, SOLUTION INTRAVENOUS at 01:59

## 2019-07-27 RX ADMIN — POTASSIUM CHLORIDE 40 MEQ: 1500 TABLET, EXTENDED RELEASE ORAL at 01:58

## 2019-07-27 RX ADMIN — NICOTINE 1 PATCH: 14 PATCH TRANSDERMAL at 01:57

## 2019-07-27 RX ADMIN — DIAZEPAM 5 MG: 5 INJECTION, SOLUTION INTRAMUSCULAR; INTRAVENOUS at 00:30

## 2019-07-27 RX ADMIN — NICOTINE 1 PATCH: 14 PATCH TRANSDERMAL at 10:31

## 2019-07-27 NOTE — DISCHARGE SUMMARY
Discharge- Sherren Meckel 1985, 34 y o  male MRN: [id number]    Unit/Bed#: 417-01 Encounter: [id number]    Primary Care Provider: No primary care provider on file  Date and time admitted to hospital: 7/26/2019  9:51 PM        * Amphetamine intoxication (HCC)-resolved as of 7/27/2019  Assessment & Plan  Patient admitted for acute delirium and rhabdomyolysis secondary to amphetamine intoxication  Amphetamine use is based on patient's self-report  Unknown whether other substances were also involved, as no drug screen was performed in ER  Treated with IV fluids and benzodiazepines in ER  Mental status cleared and vital signs normal by time of admission  Rhabdomyolysis  Assessment & Plan  Mild rhabdomyolysis secondary to amphetamine intoxication  Transaminases minimally elevated at time of presentation  LORETTA present at time of admission and resolving rapidly with IV fluid rehydration  Patient received 3L NS during admission  Eating and drinking well prior to discharge  Rhabdomyolysis resolving, with renal function and CK both improved on serial labs  Patient reports mild generalized muscle discomfort but "90% better"  Patient very eager to go home today  Will discharge home today with recommendation for close follow up with PCM to ensure full recovery  LORETTA (acute kidney injury) [facility])  Assessment & Plan  LORETTA secondary to rhabdomyolysis  Baseline creatinine approximately 1 and 2 43 at time of presentation to ER  Creatinine down to 1 33 after 3 L normal saline  Patient very eager for discharge home today  Based on clinical recovery, will discharge with recommendation for close follow up with PCM to ensure complete recovery  Encouraged oral hydration  Substance abuse [facility])  Assessment & Plan  Chronic condition  Review of chart and discussion with patient indicate lengthy history of substance abuse with multiple ER presentations and hospital admissions for complications of same  Patient reports seeing a counselor to assist in abstinence, but having difficulty paying for any treatment  He reports already working with case management on options to assist in care  Encouraged continued participation in counseling and follow up with case management if he needs additional assistance  Resolved Problems  Date Reviewed: 7/27/2019          Resolved    * (Principal) Amphetamine intoxication ([facility] ) 7/27/2019     Resolved by  Kimo Flores MD          Admission Date:   Admission Orders (From admission, onward)     Ordered        07/27/19 0028  Place in Observation  Once                     Admitting Diagnosis: Rhabdomyolysis [M62 82]  Altered mental status [R41 82]  LORETTA (acute kidney injury) ([facility] ) [N17 9]  Intoxication by drug [facility]) [F19 929]    HPI: Sherren Meckel is a 34 y o  male who presents with drug abuse  Patient was brought in by police after he was found running in traffic  Patient admits to using meth  He has been tachycardic  He lives with fiance and 2 young children  He works as a  and is concerned his drug use will cause him to lose his job  He denies any injury  Procedures Performed: No orders of the defined types were placed in this encounter  Summary of Hospital Course:  See above    Significant Findings, Care, Treatment and Services Provided:  See above    Complications:  None    Condition at Discharge: good         Discharge instructions/Information to patient and family:   See after visit summary for information provided to patient and family  Provisions for Follow-Up Care:  See after visit summary for information related to follow-up care and any pertinent home health orders  PCP: No primary care provider on file  Disposition: Home    Planned Readmission: No    Discharge Statement   I spent 25 minutes discharging the patient  This time was spent on the day of discharge   I had direct contact with the patient on the day of discharge  Additional documentation is required if more than 30 minutes were spent on discharge  Discharge Medications:  See after visit summary for reconciled discharge medications provided to patient and family

## 2019-07-27 NOTE — H&P
H&P- Linda Barber 1985, 34 y o  male MRN: [id number]    Unit/Bed#: RM03 Encounter: [id number]    Primary Care Provider: No primary care provider on file  Date and time admitted to hospital: 7/26/2019  9:51 PM        Rhabdomyolysis  Assessment & Plan  CK 5336  Continue IV fluids  Recheck labs in AM    LORETTA (acute kidney injury) ([facility] )  Assessment & Plan  Creatinine 2 43  Continue IV fluids  Recheck labs in AM    * Amphetamine intoxication [facility])  Assessment & Plan  Admit to med/surg  Has rhabdo and LORETTA  Declines any need for drug rehab  VTE Prophylaxis: Pharmacologic VTE Prophylaxis contraindicated due to low risk  / reason for no mechanical VTE prophylaxis low risk   Code Status:  Full code  POLST: There is no POLST form on file for this patient (pre-hospital)  Discussion with family: no family present    Anticipated Length of Stay:  Patient will be admitted on an Observation basis with an anticipated length of stay of  Less than 2 midnights  Justification for Hospital Stay: patient requires IV fluids    Total Time for Visit, including Counseling / Coordination of Care: 45 minutes  Greater than 50% of this total time spent on direct patient counseling and coordination of care  Chief Complaint:   Drug abuse    History of Present Illness:    Linda Barber is a 34 y o  male who presents with drug abuse  Patient was brought in by police after he was found running in traffic  Patient admits to using meth  He has been tachycardic  He lives with fiance and 2 young children  He works as a  and is concerned his drug use will cause him to lose his job  He denies any injury  Review of Systems:    Review of Systems   Constitutional: Negative  HENT: Negative  Eyes: Negative  Cardiovascular: Negative  Gastrointestinal: Negative  Endocrine: Negative  Genitourinary: Negative  Musculoskeletal: Negative  Skin: Negative  Allergic/Immunologic: Negative  Neurological: Negative  Hematological: Negative  Psychiatric/Behavioral: Positive for agitation  The patient is nervous/anxious  Past Medical and Surgical History:     Past Medical History:   Diagnosis Date    Fibula fracture 1995    left    Substance abuse ([address] )     Tibial fracture 1995    left       Past Surgical History:   Procedure Laterality Date    MANDIBLE FRACTURE SURGERY         Meds/Allergies:    Prior to Admission medications    Medication Sig Start Date End Date Taking? Authorizing Provider   gabapentin (NEURONTIN) 100 mg capsule Take 100 mg by mouth 3 (three) times a day   Yes Historical Provider, MD Mack Torresromoph-Petrolatum (XEROFORM PETROLAT PATCH 4"X4") PADS Apply 1 each topically daily Cut pads into appropriate size for each finger and then wrap with gauze and tape   2/4/19   Drew Jaleesa, DO   celecoxib (CeleBREX) 100 mg capsule Take 1 capsule (100 mg total) by mouth 2 (two) times a day for 14 days 2/8/19 2/22/19  Carine Polanco PA-C   gabapentin (NEURONTIN) 300 mg capsule Take 1 capsule (300 mg total) by mouth 3 (three) times a day for 10 days 3/8/19 3/18/19  Carine Polanco PA-C   indomethacin (INDOCIN) 50 mg capsule Take 1 capsule (50 mg total) by mouth 2 (two) times a day with meals  Patient not taking: Reported on 2/18/2019 2/5/19   Rufina Stewart MD   ketorolac (TORADOL) 10 mg tablet Take 1 tablet (10 mg total) by mouth every 6 (six) hours as needed for moderate pain  Patient not taking: Reported on 2/18/2019 2/4/19   Drew Jaleesa, DO   ketorolac (TORADOL) 10 mg tablet Take 1 tablet (10 mg total) by mouth every 6 (six) hours as needed for moderate pain  Patient not taking: Reported on 2/18/2019 2/4/19   Drew Tipton, DO   traMADol (ULTRAM) 50 mg tablet Take 1 tablet (50 mg total) by mouth every 8 (eight) hours as needed for moderate pain for up to 27 doses 2/8/19   Carine Polanco PA-C     I have reviewed home medications with patient personally  Allergies: No Known Allergies    Social History:     Marital Status: Single   Occupation:   Patient Pre-hospital Living Situation: lives with family  Patient Pre-hospital Level of Mobility: ambulatory  Patient Pre-hospital Diet Restrictions: none  Substance Use History:   Social History     Substance and Sexual Activity   Alcohol Use Yes    Alcohol/week: 7 0 standard drinks    Types: 7 Standard drinks or equivalent per week     Social History     Tobacco Use   Smoking Status Light Tobacco Smoker    Packs/day: 0 25   Smokeless Tobacco Never Used     Social History     Substance and Sexual Activity   Drug Use Yes    Types: Cocaine, Methamphetamines, Marijuana       Family History:    non-contributory    Physical Exam:     Vitals:   Blood Pressure: 121/80 (07/27/19 0000)  Pulse: (!) 117 (07/27/19 0000)  Temperature: 97 6 °F (36 4 °C) (07/26/19 2153)  Temp Source: Temporal (07/26/19 2153)  Respirations: 22 (07/27/19 0000)  Height: 5' 7" (170 2 cm) (07/26/19 2153)  Weight - Scale: 66 5 kg (146 lb 9 7 oz) (07/26/19 2153)  SpO2: 98 % (07/27/19 0000)    Physical Exam   Constitutional: He is oriented to person, place, and time  He appears well-developed and well-nourished  No distress  HENT:   Head: Normocephalic and atraumatic  Eyes: Pupils are equal, round, and reactive to light  EOM are normal    Neck: Normal range of motion  Neck supple  Cardiovascular: Regular rhythm  tachy   Pulmonary/Chest: Effort normal and breath sounds normal    Abdominal: Soft  Bowel sounds are normal    Musculoskeletal: Normal range of motion  He exhibits no edema or deformity  Neurological: He is alert and oriented to person, place, and time  Skin: Skin is warm and dry  He is not diaphoretic  Psychiatric:   anxious   Vitals reviewed  Additional Data:     Lab Results: I have personally reviewed pertinent reports        Results from last 7 days   Lab Units 07/26/19  2212   WBC Thousand/uL 18 64* HEMOGLOBIN g/dL 15 1   HEMATOCRIT % 48 9   PLATELETS Thousands/uL 337   NEUTROS PCT % 86*   LYMPHS PCT % 8*   MONOS PCT % 5   EOS PCT % 0     Results from last 7 days   Lab Units 07/26/19  2212   SODIUM mmol/L 142   POTASSIUM mmol/L 3 3*   CHLORIDE mmol/L 100   CO2 mmol/L 20*   BUN mg/dL 23   CREATININE mg/dL 2 43*   ANION GAP mmol/L 22*   CALCIUM mg/dL 10 5*   ALBUMIN g/dL 5 3*   TOTAL BILIRUBIN mg/dL 1 40*   ALK PHOS U/L 94   ALT U/L 37   AST U/L 72*   GLUCOSE RANDOM mg/dL 144*                       Imaging: I have personally reviewed pertinent reports  No orders to display       EKG, Pathology, and Other Studies Reviewed on Admission:   · EKG: tachy    Allscridenise / Epic Records Reviewed: Yes     ** Please Note: This note has been constructed using a voice recognition system   **

## 2019-07-27 NOTE — ED PROVIDER NOTES
History  Chief Complaint   Patient presents with    Altered Mental Status     Pt was brought in by EMS for altered mental status  Pt was using meth  and was found playing in the street  HPI  44-year-old male brought in by EMS for altered mental status  Police are at bedside and state they found the patient running around through traffic  No reported trauma  Patient seemed confused and intoxicated some form of stimulant  Patient was not making any sense but was cooperative with EMS police  Patient has no complaints  After treating the patient, he is much more calm  He endorses smoking and injecting methamphetamine tonight  He denies any trauma denies headache neck pain chest pain abdominal pain  Prior to Admission Medications   Prescriptions Last Dose Informant Patient Reported?  Taking?   gabapentin (NEURONTIN) 100 mg capsule Past Week at Unknown time  Yes Yes   Sig: Take 100 mg by mouth 3 (three) times a day   gabapentin (NEURONTIN) 300 mg capsule   No No   Sig: Take 1 capsule (300 mg total) by mouth 3 (three) times a day for 10 days   ketorolac (TORADOL) 10 mg tablet More than a month at Unknown time Self No No   Sig: Take 1 tablet (10 mg total) by mouth every 6 (six) hours as needed for moderate pain   Patient not taking: Reported on 2/18/2019   ketorolac (TORADOL) 10 mg tablet More than a month at Unknown time Self No No   Sig: Take 1 tablet (10 mg total) by mouth every 6 (six) hours as needed for moderate pain   Patient not taking: Reported on 2/18/2019   traMADol (ULTRAM) 50 mg tablet More than a month at Unknown time  No No   Sig: Take 1 tablet (50 mg total) by mouth every 8 (eight) hours as needed for moderate pain for up to 27 doses      Facility-Administered Medications: None       Past Medical History:   Diagnosis Date    Amphetamine intoxication ([facility] [address] ) 1/31/2019    Fibula fracture 1995    left    Renal disorder     Substance abuse ([facility] [address] )     Tibial fracture 1995    left       Past Surgical History:   Procedure Laterality Date    MANDIBLE FRACTURE SURGERY         History reviewed  No pertinent family history  I have reviewed and agree with the history as documented  Social History     Tobacco Use    Smoking status: Light Tobacco Smoker     Packs/day: 0 25     Years: 10 00     Pack years: 2 50    Smokeless tobacco: Never Used   Substance Use Topics    Alcohol use: Yes     Alcohol/week: 7 0 standard drinks     Types: 7 Standard drinks or equivalent per week     Frequency: 2-3 times a week     Drinks per session: 3 or 4     Binge frequency: Less than monthly    Drug use: Yes     Types: Cocaine, Methamphetamines, Marijuana        Review of Systems   Constitutional: Negative  Negative for chills and fever  HENT: Negative  Negative for ear pain and sore throat  Eyes: Negative  Negative for pain and discharge  Respiratory: Negative  Negative for chest tightness and shortness of breath  Cardiovascular: Negative  Negative for chest pain and palpitations  Gastrointestinal: Negative  Negative for abdominal pain, nausea and vomiting  Endocrine: Negative  Negative for polyphagia and polyuria  Genitourinary: Negative  Negative for dysuria and flank pain  Musculoskeletal: Negative  Negative for arthralgias and back pain  Skin: Negative  Negative for color change and wound  Allergic/Immunologic: Negative  Negative for food allergies and immunocompromised state  Neurological: Negative  Negative for weakness and headaches  Hematological: Negative  Negative for adenopathy  Does not bruise/bleed easily  Psychiatric/Behavioral: Positive for behavioral problems and confusion  Negative for suicidal ideas  The patient is nervous/anxious  Physical Exam  Physical Exam   Constitutional: He appears well-developed and well-nourished  No distress  Intermittently cooperative without being belligerent   HENT:   Head: Normocephalic and atraumatic     Right Ear: External ear normal    Left Ear: External ear normal    Mouth/Throat: Oropharynx is clear and moist    Eyes: Pupils are equal, round, and reactive to light  Conjunctivae and EOM are normal  Right eye exhibits no discharge  Left eye exhibits no discharge  No scleral icterus  Neck: Normal range of motion  Neck supple  No tracheal deviation present  No thyromegaly present  Cardiovascular: Normal rate, regular rhythm and intact distal pulses  Exam reveals no gallop and no friction rub  No murmur heard  Pulmonary/Chest: Effort normal and breath sounds normal  No stridor  No respiratory distress  He has no wheezes  He has no rales  Abdominal: Soft  Bowel sounds are normal  He exhibits no distension  There is no tenderness  There is no rebound and no guarding  Musculoskeletal: Normal range of motion  He exhibits no edema or deformity  Neurological: He is alert  No cranial nerve deficit  GCS eye subscore is 4  GCS verbal subscore is 4  GCS motor subscore is 6  Patient moving all extremities   Skin: Skin is warm and dry  No rash noted  He is not diaphoretic  No erythema  No signs trauma   Psychiatric: He has a normal mood and affect  His behavior is normal  Thought content normal    Nursing note and vitals reviewed        Vital Signs  ED Triage Vitals [07/26/19 2153]   Temperature Pulse Respirations Blood Pressure SpO2   97 6 °F (36 4 °C) (!) 130 20 130/84 94 %      Temp Source Heart Rate Source Patient Position - Orthostatic VS BP Location FiO2 (%)   Temporal Monitor Sitting Left arm --      Pain Score       No Pain           Vitals:    07/27/19 0704 07/27/19 1053 07/27/19 1306 07/27/19 1504   BP: 109/62  116/54 114/62   Pulse: 64 96 74 70   Patient Position - Orthostatic VS: Lying  Lying Lying         Visual Acuity  Visual Acuity      Most Recent Value   L Pupil Size (mm)  4   R Pupil Size (mm)  4   L Pupil Shape  Round   R Pupil Shape  Round          ED Medications  Medications   diazepam (VALIUM) injection 10 mg (10 mg Intravenous Given 7/26/19 2207)   sodium chloride 0 9 % bolus 1,000 mL (0 mL Intravenous Stopped 7/26/19 2330)   diazepam (VALIUM) injection 10 mg (10 mg Intravenous Given 7/26/19 2231)   sodium chloride 0 9 % bolus 1,000 mL (0 mL Intravenous Stopped 7/27/19 0039)   diazepam (VALIUM) injection 5 mg (5 mg Intravenous Given 7/27/19 0030)   potassium chloride (K-DUR,KLOR-CON) CR tablet 40 mEq (40 mEq Oral Given 7/27/19 0158)       Diagnostic Studies  Results Reviewed     Procedure Component Value Units Date/Time    Hepatitis panel, acute [[id number]]  (Normal) Collected:  07/27/19 0424    Lab Status:  Final result Specimen:  Blood from Arm, Left Updated:  07/27/19 1420     Hepatitis B Surface Ag Non-reactive     Hep A IgM Non-reactive     Hepatitis C Ab Non-reactive     Hep B C IgM Non-reactive    Rapid HIV 1/2 AB-AG Combo [[id number]]  (Normal) Collected:  07/27/19 0424    Lab Status:  Final result Specimen:  Blood from Arm, Left Updated:  07/27/19 1338     Rapid HIV 1 AND 2 Non-Reactive     HIV-1 P24 Ag Screen Non-Reactive    Narrative:       Negative for HIV-1 p24 Antigen  Negative for HIV-1 and/or HIV-2 Antibody      CKMB [[id number]]  (Abnormal) Collected:  07/27/19 0424    Lab Status:  Final result Specimen:  Blood from Arm, Left Updated:  07/27/19 0602     CK-MB Index <1 0 %      CK-MB 5 6 ng/mL     Basic metabolic panel [[id number]]  (Abnormal) Collected:  07/27/19 0424    Lab Status:  Final result Specimen:  Blood from Arm, Left Updated:  07/27/19 0533     Sodium 141 mmol/L      Potassium 4 0 mmol/L      Chloride 107 mmol/L      CO2 23 mmol/L      ANION GAP 11 mmol/L      BUN 21 mg/dL      Creatinine 1 40 mg/dL      Glucose 94 mg/dL      Glucose, Fasting 94 mg/dL      Calcium 8 9 mg/dL      eGFR 77 ml/min/1 73sq m     Narrative:       [place] guidelines for Chronic Kidney Disease (CKD):     Stage 1 with normal or high GFR (GFR > 90 mL/min/1 73 square meters)   Stage 2 Mild CKD (GFR = 60-89 mL/min/1 73 square meters)    Stage 3A Moderate CKD (GFR = 45-59 mL/min/1 73 square meters)    Stage 3B Moderate CKD (GFR = 30-44 mL/min/1 73 square meters)    Stage 4 Severe CKD (GFR = 15-29 mL/min/1 73 square meters)    Stage 5 End Stage CKD (GFR <15 mL/min/1 73 square meters)  Note: GFR calculation is accurate only with a steady state creatinine    CK (with reflex to MB) [[id number]]  (Abnormal) Collected:  07/27/19 0424    Lab Status:  Final result Specimen:  Blood from Arm, Left Updated:  07/27/19 0533     Total CK 3,479 U/L     CBC (With Platelets) [[id number]]  (Abnormal) Collected:  07/27/19 0424    Lab Status:  Final result Specimen:  Blood from Arm, Left Updated:  07/27/19 0437     WBC 9 90 Thousand/uL      RBC 5 18 Million/uL      Hemoglobin 12 9 g/dL      Hematocrit 40 9 %      MCV 79 fL      MCH 24 9 pg      MCHC 31 5 g/dL      RDW 13 1 %      Platelets 899 Thousands/uL      MPV 10 6 fL     CKMB [[id number]]  (Abnormal) Collected:  07/26/19 2212    Lab Status:  Final result Specimen:  Blood from Arm, Right Updated:  07/26/19 2315     CK-MB Index <1 0 %      CK-MB 6 7 ng/mL     TSH [[id number]]  (Abnormal) Collected:  07/26/19 2212    Lab Status:  Final result Specimen:  Blood from Arm, Right Updated:  07/26/19 2310     TSH 3RD GENERATON 4 330 uIU/mL     Narrative:       Patients undergoing fluorescein dye angiography may retain small amounts of fluorescein in the body for 48-72 hours post procedure  Samples containing fluorescein can produce falsely depressed TSH values  If the patient had this procedure,a specimen should be resubmitted post fluorescein clearance        CK (with reflex to MB) [[id number]]  (Abnormal) Collected:  07/26/19 2212    Lab Status:  Final result Specimen:  Blood from Arm, Right Updated:  07/26/19 2310     Total CK 5,653 U/L     Salicylate level [[id number]]  (Abnormal) Collected:  07/26/19 2212    Lab Status:  Final result Specimen:  Blood from Arm, Right Updated:  65/31/45 9311     Salicylate Lvl <3 mg/dL     Acetaminophen level-If concentration is detectable, please discuss with medical  on call   [[id number]]  (Abnormal) Collected:  07/26/19 2212    Lab Status:  Final result Specimen:  Blood from Arm, Right Updated:  07/26/19 2310     Acetaminophen Level <2 0 ug/mL     Comprehensive metabolic panel [[id number]]  (Abnormal) Collected:  07/26/19 2212    Lab Status:  Final result Specimen:  Blood from Arm, Right Updated:  07/26/19 2233     Sodium 142 mmol/L      Potassium 3 3 mmol/L      Chloride 100 mmol/L      CO2 20 mmol/L      ANION GAP 22 mmol/L      BUN 23 mg/dL      Creatinine 2 43 mg/dL      Glucose 144 mg/dL      Calcium 10 5 mg/dL      AST 72 U/L      ALT 37 U/L      Alkaline Phosphatase 94 U/L      Total Protein 9 5 g/dL      Albumin 5 3 g/dL      Total Bilirubin 1 40 mg/dL      eGFR 39 ml/min/1 73sq m     Narrative:       [place] guidelines for Chronic Kidney Disease (CKD):     Stage 1 with normal or high GFR (GFR > 90 mL/min/1 73 square meters)    Stage 2 Mild CKD (GFR = 60-89 mL/min/1 73 square meters)    Stage 3A Moderate CKD (GFR = 45-59 mL/min/1 73 square meters)    Stage 3B Moderate CKD (GFR = 30-44 mL/min/1 73 square meters)    Stage 4 Severe CKD (GFR = 15-29 mL/min/1 73 square meters)    Stage 5 End Stage CKD (GFR <15 mL/min/1 73 square meters)  Note: GFR calculation is accurate only with a steady state creatinine    Ethanol [[id number]]  (Normal) Collected:  07/26/19 2212    Lab Status:  Final result Specimen:  Blood from Arm, Right Updated:  07/26/19 2227     Ethanol Lvl <3 mg/dL     CBC and differential [[id number]]  (Abnormal) Collected:  07/26/19 2212    Lab Status:  Final result Specimen:  Blood from Arm, Right Updated:  07/26/19 2217     WBC 18 64 Thousand/uL      RBC 6 15 Million/uL      Hemoglobin 15 1 g/dL      Hematocrit 48 9 %      MCV 80 fL      MCH 24 6 pg      MCHC 30 9 g/dL      RDW 13 2 %      MPV 10 7 fL      Platelets 069 Thousands/uL      nRBC 0 /100 WBCs      Neutrophils Relative 86 %      Immat GRANS % 1 %      Lymphocytes Relative 8 %      Monocytes Relative 5 %      Eosinophils Relative 0 %      Basophils Relative 0 %      Neutrophils Absolute 16 02 Thousands/µL      Immature Grans Absolute 0 09 Thousand/uL      Lymphocytes Absolute 1 56 Thousands/µL      Monocytes Absolute 0 90 Thousand/µL      Eosinophils Absolute 0 01 Thousand/µL      Basophils Absolute 0 06 Thousands/µL                  No orders to display              Procedures  Procedures       ED Course  ED Course as of Jul 28 1333   Fri Jul 26, 2019   2312 Total CK(!): 5,336   Sat Jul 27, 2019   0024 Creatinine(!): 2 43   0710 EKG at 22:17 on 26 July showed sinus tachycardia at 162 beats per minute with nonspecific T-wave abnormalities, normal intervals  Case discussed with on-call toxicology  Continue giving benzodiazepine drugs until patient's tachycardia drops below 120  Patient becomes overly sedated secondary to the medication, consider intubation  Patient is found to have a KI and rhabdomyolysis  Will start aggressive fluid rehydration and admit patient to the hospital                             MDM  Number of Diagnoses or Management Options  LORETTA (acute kidney injury) ([facility] [address] ): Altered mental status:   Intoxication by drug [facility]):   Rhabdomyolysis:   Diagnosis management comments: 72-year-old male comes in for evaluation altered mental status likely intoxication by drug  Patient is found to be tachycardic  Will treat with benzodiazepines, will get labs including CBC, CMP, CK, EKG,:  Panel  If patient fails to respond to therapy, consider CT of his head for altered mental status  Disposition  Final diagnoses:    Altered mental status   LORETTA (acute kidney injury) ([facility] [address] )   Rhabdomyolysis   Intoxication by drug [facility])     Time reflects when diagnosis was documented in both MDM as applicable and the Disposition within this note     Time User Action Codes Description Comment    7/26/2019 11:13 PM [place] Ee Add [R41 82] Altered mental status     7/26/2019 11:13 PM [place] Ee Add [N17 9] LORETTA (acute kidney injury) ([facility] [address] )     7/26/2019 11:13 PM [place] Ee Add [M62 82] Rhabdomyolysis     7/27/2019 12:25 AM [place] Ee Add [C15 588] Intoxication by drug [facility])       ED Disposition     ED Disposition Condition Date/Time Comment    Admit Stable Sat Jul 27, 2019 12:26 AM [facility] obs          Follow-up Information    None         Discharge Medication List as of 7/27/2019  5:25 PM      STOP taking these medications       gabapentin (NEURONTIN) 100 mg capsule Comments:   Reason for Stopping:         gabapentin (NEURONTIN) 300 mg capsule Comments:   Reason for Stopping:         ketorolac (TORADOL) 10 mg tablet Comments:   Reason for Stopping:         ketorolac (TORADOL) 10 mg tablet Comments:   Reason for Stopping:         traMADol (ULTRAM) 50 mg tablet Comments:   Reason for Stopping:             Outpatient Discharge Orders   Discharge Diet     Activity as tolerated     Call provider for:  severe uncontrolled pain     Call provider for:  persistent dizziness or light-headedness     Call provider for:  persistent nausea or vomiting       ED Provider  Electronically Signed by           Mayo Cranker, MD  07/27/19 2688       Mayo Cranker, MD  07/28/19 5782

## 2019-07-27 NOTE — ASSESSMENT & PLAN NOTE
LORETTA secondary to rhabdomyolysis  Baseline creatinine approximately 1 and 2 43 at time of presentation to ER  Creatinine down to 1 33 after 3 L normal saline  Patient very eager for discharge home today  Based on clinical recovery, will discharge with recommendation for close follow up with PCM to ensure complete recovery  Encouraged oral hydration

## 2019-07-27 NOTE — ASSESSMENT & PLAN NOTE
Mild rhabdomyolysis secondary to amphetamine intoxication  Transaminases minimally elevated at time of presentation  LORETTA present at time of admission and resolving rapidly with IV fluid rehydration  Patient received 3L NS during admission  Eating and drinking well prior to discharge  Rhabdomyolysis resolving, with renal function and CK both improved on serial labs  Patient reports mild generalized muscle discomfort but "90% better"  Patient very eager to go home today  Will discharge home today with recommendation for close follow up with PCM to ensure full recovery

## 2019-07-27 NOTE — CONSULTS
PHONE [address] Toxicology  Alisia Masters 34 y o  male MRN: [id number]  Unit/Bed#: [facility]den Encounter: [id number]      Reason for Consult / Principal Problem: Agitated delirium  Inpatient consult to Toxicology  Consult performed by: Prince Jackson MD  Consult ordered by: Oralia Meneses MD        07/26/19      ASSESSMENT:  1) Agitated delirium  2) Acute kidney injury    DISCUSSION/ RECOMMENDATIONS:  Patient presented to the [facility] emergency department after being found altered and agitated by police  He was noted on arrival to be profoundly tachycardic and diaphoretic, with agitation  Initial temperature normal and not reported to have had any seizures  Patient was given benzodiazepines with improvement of his tachycardia, diaphoresis and agitation  The patient's presentation is consistent with agitated delirium, most likely from sympathomimetic toxicity  Patient does evidently have a history of methamphetamine abuse  Patient also has acute kidney injury which in this setting is most likely going to be secondary to rhabdomyolysis  CK is pending  I have recommended that IV fluids be started and that benzodiazepines be continued as needed  Goal of treatment is resolution of agitation, heart rate under 521, and systolic blood pressure under 160  In general I would recommend giving bolus doses of diazepam, 10-20 mg at a time or lorazepam, 2-4 mg at a time  Particularly concerning findings in the setting of agitated delirium would be hyperthermia or seizures and patient should be monitored closely for these  If he does develop either of these findings please give benzodiazepines and contact me to discuss further management  If hyperthermia or seizures are refractory to benzodiazepines, other measures including active cooling or intubation with deep sedation and neuromuscular blockade could be indicated  Please check repeat BMP and CK in the morning    Coma panel pending, please contact me if any abnormalities  Patient can be medically cleared from toxicology perspective when he is back to baseline mental status with normal vital signs and exam, and has not received any benzodiazepines for at least 8 hours, and when LORETTA improved with resolution of rhabdomyolysis  For further questions, please call [facility]  Service or Patient Access Center to reach the medical  on call  Hx and PE limited by the dynamics of a phone consultation  I have not personally interviewed or evaluated the patient, but only advised based on the information provided to me  Primary provider is responsible for all clinical decisions  Pertinent history, physical exam and clinical findings and course discussed: Savanna Fletcher is a 34y o  year old male who presents with agitated delirium  Per my discussion with treating ED physician, the patient was brought in after being found altered by police  He was profoundly tachycardic, diaphoretic and agitated which improved with administration of benzodiazepines  Unclear what substances the patient may have used or reasons for use  Patient does have prior history of methamphetamine abuse  Not noted to have any hyperreflexia or clonus on exam     Review of systems and physical exam not performed by me      Historical Information   Past Medical History:   Diagnosis Date    Fibula fracture 1995    left    Substance abuse ([address] )     Tibial fracture 1995    left     Past Surgical History:   Procedure Laterality Date    MANDIBLE FRACTURE SURGERY       Social History   Social History     Substance and Sexual Activity   Alcohol Use Yes    Alcohol/week: 7 0 standard drinks    Types: 7 Standard drinks or equivalent per week     Social History     Substance and Sexual Activity   Drug Use Yes    Types: Cocaine, Methamphetamines, Marijuana     Social History     Tobacco Use   Smoking Status Light Tobacco Smoker    Packs/day: 0 25   Smokeless Tobacco Never Used     History reviewed  No pertinent family history  Prior to Admission medications    Medication Sig Start Date End Date Taking? Authorizing Provider   gabapentin (NEURONTIN) 100 mg capsule Take 100 mg by mouth 3 (three) times a day   Yes Historical Provider, MD Mack Torresromoph-Petrolatum (XEROFORM PETROLAT PATCH 4"X4") PADS Apply 1 each topically daily Cut pads into appropriate size for each finger and then wrap with gauze and tape   2/4/19   David Kumar DO   celecoxib (CeleBREX) 100 mg capsule Take 1 capsule (100 mg total) by mouth 2 (two) times a day for 14 days 2/8/19 2/22/19  Mary Kate Smith PA-C   gabapentin (NEURONTIN) 300 mg capsule Take 1 capsule (300 mg total) by mouth 3 (three) times a day for 10 days 3/8/19 3/18/19  Mary Kate Smith PA-C   indomethacin (INDOCIN) 50 mg capsule Take 1 capsule (50 mg total) by mouth 2 (two) times a day with meals  Patient not taking: Reported on 2/18/2019 2/5/19   Alverto Gonzalez MD   ketorolac (TORADOL) 10 mg tablet Take 1 tablet (10 mg total) by mouth every 6 (six) hours as needed for moderate pain  Patient not taking: Reported on 2/18/2019 2/4/19   David Kumar DO   ketorolac (TORADOL) 10 mg tablet Take 1 tablet (10 mg total) by mouth every 6 (six) hours as needed for moderate pain  Patient not taking: Reported on 2/18/2019 2/4/19   David Kumar DO   traMADol Lurlene Laser) 50 mg tablet Take 1 tablet (50 mg total) by mouth every 8 (eight) hours as needed for moderate pain for up to 27 doses 2/8/19   Mary Kate Smith PA-C       Current Facility-Administered Medications   Medication Dose Route Frequency    diazepam (VALIUM) injection 10 mg  10 mg Intravenous Once    sodium chloride 0 9 % bolus 1,000 mL  1,000 mL Intravenous Once       No Known Allergies    Objective     No intake or output data in the 24 hours ending 07/26/19 8550    Invasive Devices:   Peripheral IV 07/26/19 Right Antecubital (Active)   Site Assessment Clean 7/26/2019 10:12 PM Dressing Type Transparent;Securing device 7/26/2019 10:12 PM   Line Status Blood return noted; Saline locked 7/26/2019 10:12 PM   Dressing Status Intact;Dry 7/26/2019 10:12 PM       Vitals   Vitals:    07/26/19 2153   BP: 130/84   TempSrc: Temporal   Pulse: (!) 130   Resp: 20   Patient Position - Orthostatic VS: Sitting   Temp: 97 6 °F (36 4 °C)         EKG, Pathology, and/or Other Studies:  Discussed with ED physician      Lab Results: I have personally reviewed pertinent reports  Labs:  Results from last 7 days   Lab Units 07/26/19  2212   WBC Thousand/uL 18 64*   HEMOGLOBIN g/dL 15 1   HEMATOCRIT % 48 9   PLATELETS Thousands/uL 337   NEUTROS PCT % 86*   LYMPHS PCT % 8*   MONOS PCT % 5      Results from last 7 days   Lab Units 07/26/19  2212   POTASSIUM mmol/L 3 3*   CHLORIDE mmol/L 100   CO2 mmol/L 20*   BUN mg/dL 23   CREATININE mg/dL 2 43*   CALCIUM mg/dL 10 5*   ALK PHOS U/L 94   ALT U/L 37   AST U/L 72*              0   Lab Value Date/Time    TROPONINI <0 02 01/31/2019 0401         Results from last 7 days   Lab Units 07/26/19  2212   ETHANOL LVL mg/dL <3     Invalid input(s): EXTPREGUR      Counseling / Coordination of Care  Total time spent today 31 minutes  This was a phone consultation

## 2019-07-27 NOTE — ASSESSMENT & PLAN NOTE
Patient admitted for acute delirium and rhabdomyolysis secondary to amphetamine intoxication  Amphetamine use is based on patient's self-report  Unknown whether other substances were also involved, as no drug screen was performed in ER  Treated with IV fluids and benzodiazepines in ER  Mental status cleared and vital signs normal by time of admission

## 2019-07-27 NOTE — UTILIZATION REVIEW
Initial Clinical Review    Admission: Date/Time/Statement: 7/27/19 @ [facility] Chun This Encounter   Procedures    Place in Observation     Standing Status:   Standing     Number of Occurrences:   1     Order Specific Question:   Admitting Physician     Answer:   Lilia Servin [[id number]]     Order Specific Question:   Level of Care     Answer:   Med Surg [16]     ED Arrival Information     Expected Arrival Acuity Means of Arrival Escorted By Service Admission Type    7/26/2019 21:44 7/26/2019 21:50 Urgent Ambulance [facility] General Medicine Urgent    Arrival Complaint    Altered Mental Status        Chief Complaint   Patient presents with    Altered Mental Status     Pt was brought in by EMS for altered mental status  Pt was using meth  and was found playing in the street  Assessment/Plan: 35 y/o male presents to ED from home by EMS with altered mental status  Found by police running in traffic, seeming confused and intoxicated  In ED, pt admits to smoking and injecting methamphetamine  Tachycardic on exam   Admitted as observation due to amphetamine intoxication, rhabdomyolysis, LORETTA  Declines need for drug rehab  Continue IVF  Repeat am labs  Consult toxicology  7/26 Toxicology consult:  Continue IVF  Bolus doses of diazepam   Monitor closely for hyperthermia and seizures      ED Triage Vitals [07/26/19 2153]   Temperature Pulse Respirations Blood Pressure SpO2   97 6 °F (36 4 °C) (!) 130 20 130/84 94 %      Temp Source Heart Rate Source Patient Position - Orthostatic VS BP Location FiO2 (%)   Temporal Monitor Sitting Left arm --      Pain Score       No Pain        Wt Readings from Last 1 Encounters:   07/26/19 66 5 kg (146 lb 9 7 oz)     Additional Vital Signs:   07/27/19 0704  97 3 °F (36 3 °C)Abnormal   64  16  109/62  99 %  None (Room air)   07/27/19 0425  97 9 °F (36 6 °C)  89  16  111/65  98 %  None (Room air)   07/27/19 0130  --  100  21  119/76  98 % None (Room air)   07/27/19 0000  --  117Abnormal   22  121/80  98 %  None (Room air)       Pertinent Labs/Diagnostic Test Results:   Results from last 7 days   Lab Units 07/27/19  0424 07/26/19  2212   WBC Thousand/uL 9 90 18 64*   HEMOGLOBIN g/dL 12 9 15 1   HEMATOCRIT % 40 9 48 9   PLATELETS Thousands/uL 262 337   NEUTROS ABS Thousands/µL  --  16 02*     Results from last 7 days   Lab Units 07/27/19  0424 07/26/19  2212   SODIUM mmol/L 141 142   POTASSIUM mmol/L 4 0 3 3*   CHLORIDE mmol/L 107 100   CO2 mmol/L 23 20*   ANION GAP mmol/L 11 22*   BUN mg/dL 21 23   CREATININE mg/dL 1 40* 2 43*   EGFR ml/min/1 73sq m 77 39   CALCIUM mg/dL 8 9 10 5*     Results from last 7 days   Lab Units 07/26/19  2212   AST U/L 72*   ALT U/L 37   ALK PHOS U/L 94   TOTAL PROTEIN g/dL 9 5*   ALBUMIN g/dL 5 3*   TOTAL BILIRUBIN mg/dL 1 40*     Results from last 7 days   Lab Units 07/27/19  0424 07/26/19  2212   GLUCOSE RANDOM mg/dL 94 144*     Results from last 7 days   Lab Units 07/27/19  0424 07/26/19  2212   CK TOTAL U/L 3,479* 5,336*   CK MB INDEX % <1 0 <1 0   CK MB ng/mL 5 6* 6 7*     Results from last 7 days   Lab Units 07/26/19  2212   TSH 3RD GENERATON uIU/mL 4 330*     Results from last 7 days   Lab Units 07/26/19  2212   ETHANOL LVL mg/dL <3   ACETAMINOPHEN LVL ug/mL <7 5*   SALICYLATE LVL mg/dL <3*         ED Treatment:   Medication Administration from 07/26/2019 2150 to 07/27/2019 1033       Date/Time Order Dose Route Action     07/26/2019 2207 diazepam (VALIUM) injection 10 mg 10 mg Intravenous Given     07/26/2019 2230 sodium chloride 0 9 % bolus 1,000 mL 1,000 mL Intravenous New Bag     07/26/2019 2231 diazepam (VALIUM) injection 10 mg 10 mg Intravenous Given     07/26/2019 2339 sodium chloride 0 9 % bolus 1,000 mL 1,000 mL Intravenous New Bag     07/27/2019 0030 diazepam (VALIUM) injection 5 mg 5 mg Intravenous Given     07/27/2019 0159 sodium chloride 0 9 % infusion 125 mL/hr Intravenous New Bag     07/27/2019 0157 nicotine (NICODERM CQ) 14 mg/24hr TD 24 hr patch 1 patch 1 patch Transdermal Medication Applied     07/27/2019 0158 potassium chloride (K-DUR,KLOR-CON) CR tablet 40 mEq 40 mEq Oral Given        Past Medical History:   Diagnosis Date    Fibula fracture 1995    left    Substance abuse ([facility] )     Tibial fracture 1995    left     Present on Admission:   Amphetamine intoxication ([facility] [place] [address] )   LORETTA (acute kidney injury) ([facility] )      Admitting Diagnosis: Altered mental status [R41 82]  Age/Sex: 34 y o  male  Admission Orders:    Current Facility-Administered Medications:  acetaminophen 650 mg Oral Q6H PRN   calcium carbonate 1,000 mg Oral Daily PRN   nicotine 1 patch Transdermal Daily   ondansetron 4 mg Intravenous Q6H PRN   sodium chloride 125 mL/hr Intravenous Continuous       IP CONSULT TO TOXICOLOGY   VS  Tele  Acute hepatitis panel    Network Utilization Review Department  Phone: [phone]; Fax [phone]  [email or web address]  ATTENTION: Please call with any questions or concerns to [phone]  and carefully listen to the prompts so that you are directed to the right person  Send all requests for admission clinical reviews, approved or denied determinations and any other requests to fax [phone]   All voicemails are confidential

## 2019-07-27 NOTE — ASSESSMENT & PLAN NOTE
Chronic condition  Review of chart and discussion with patient indicate lengthy history of substance abuse with multiple ER presentations and hospital admissions for complications of same  Patient reports seeing a counselor to assist in abstinence, but having difficulty paying for any treatment  He reports already working with case management on options to assist in care  Encouraged continued participation in counseling and follow up with case management if he needs additional assistance

## 2019-07-27 NOTE — PLAN OF CARE
Problem: Potential for Falls  Goal: Patient will remain free of falls  Description  INTERVENTIONS:  - Assess patient frequently for physical needs  -  [place] fall precautions   - Educate patient on patient safety   - Instruct patient to call for assistance with activity   - Modify environment to reduce risk of injury   7/27/2019 1719 by Paola Piedra RN  Outcome: Progressing  7/27/2019 1717 by Paola Piedra RN  Outcome: Progressing     Problem: DISCHARGE PLANNING  Goal: Discharge to home or other facility with appropriate resources  Description  INTERVENTIONS:  - Identify barriers to discharge w/patient   - Arrange for needed discharge resources   - Identify discharge learning needs   - Refer to Case Management Department for coordinating discharge planning if the patient needs post-hospital services    7/27/2019 1719 by Paola Piedra RN  Outcome: Progressing  7/27/2019 1717 by Paola Piedra RN  Outcome: Progressing     Problem: Knowledge Deficit  Goal: Patient/family/caregiver demonstrates understanding of disease process, treatment plan, medications, and discharge instructions  Description  Complete learning assessment and assess knowledge base    Interventions:  - Provide teaching at level of understanding  - Provide teaching via preferred learning methods  7/27/2019 1719 by Paola Piedra RN  Outcome: Progressing  7/27/2019 1717 by Paola Piedra RN  Outcome: Progressing

## 2019-07-29 LAB
ATRIAL RATE: 162 BPM
P AXIS: 48 DEGREES
PR INTERVAL: 126 MS
QRS AXIS: 66 DEGREES
QRSD INTERVAL: 66 MS
QT INTERVAL: 302 MS
QTC INTERVAL: 495 MS
T WAVE AXIS: 46 DEGREES
VENTRICULAR RATE: 162 BPM

## 2019-07-29 PROCEDURE — 93010 ELECTROCARDIOGRAM REPORT: CPT | Performed by: INTERNAL MEDICINE

## 2019-07-31 NOTE — PROGRESS NOTES
7/31/19:  Patient discharged from OT due to lack of attendance  He did not respond to telephone message left for him asking him to call therapy to report status and/or reschedule appointment

## 2021-10-23 ENCOUNTER — HOSPITAL ENCOUNTER (EMERGENCY)
Facility: HOSPITAL | Age: 36
Discharge: HOME | End: 2021-10-23
Attending: EMERGENCY MEDICINE

## 2021-10-23 VITALS
RESPIRATION RATE: 20 BRPM | OXYGEN SATURATION: 98 % | DIASTOLIC BLOOD PRESSURE: 93 MMHG | WEIGHT: 145 LBS | SYSTOLIC BLOOD PRESSURE: 134 MMHG | BODY MASS INDEX: 22.71 KG/M2 | HEART RATE: 89 BPM | TEMPERATURE: 97 F
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
## 2021-10-23 LAB
ALT SERPL W P-5'-P-CCNC: 33 U/L (ref 12–78)
HBV SURFACE AB SER-ACNC: 4.38 MIU/ML
HBV SURFACE AG SER QL: NORMAL
HCV AB SER QL: NORMAL

## 2021-10-23 PROCEDURE — 99283 EMERGENCY DEPT VISIT LOW MDM: CPT

## 2021-10-23 PROCEDURE — 84460 ALANINE AMINO (ALT) (SGPT): CPT | Performed by: EMERGENCY MEDICINE

## 2021-10-23 PROCEDURE — 87340 HEPATITIS B SURFACE AG IA: CPT | Performed by: EMERGENCY MEDICINE

## 2021-10-23 PROCEDURE — 99281 EMR DPT VST MAYX REQ PHY/QHP: CPT | Performed by: EMERGENCY MEDICINE

## 2021-10-23 PROCEDURE — 87389 HIV-1 AG W/HIV-1&-2 AB AG IA: CPT | Performed by: EMERGENCY MEDICINE

## 2021-10-23 PROCEDURE — 36415 COLL VENOUS BLD VENIPUNCTURE: CPT | Performed by: EMERGENCY MEDICINE

## 2021-10-23 PROCEDURE — 86706 HEP B SURFACE ANTIBODY: CPT | Performed by: EMERGENCY MEDICINE

## 2021-10-23 PROCEDURE — 86803 HEPATITIS C AB TEST: CPT | Performed by: EMERGENCY MEDICINE

## 2021-10-25 LAB — HIV 1+2 AB+HIV1 P24 AG SERPL QL IA: NORMAL

## 2023-04-24 NOTE — TELEPHONE ENCOUNTER
Abbe Velez, patient's girlfriend is calling    [phone]    Abbe Velez is calling to schedule an appt for the patient off of a referral from Dr Leandro Lockwood but patient does not have insurance & he would need to be self pay  Please advise if we can see him  Patient did apply for Medicaid but has not gotten anything with approval at this point 
S/w pt and let him know we do not take self pay 
.